# Patient Record
Sex: FEMALE | Race: WHITE | NOT HISPANIC OR LATINO | Employment: OTHER | URBAN - METROPOLITAN AREA
[De-identification: names, ages, dates, MRNs, and addresses within clinical notes are randomized per-mention and may not be internally consistent; named-entity substitution may affect disease eponyms.]

---

## 2022-12-15 ENCOUNTER — APPOINTMENT (OUTPATIENT)
Dept: CARDIOLOGY | Facility: MEDICAL CENTER | Age: 82
DRG: 417 | End: 2022-12-15
Attending: INTERNAL MEDICINE
Payer: MEDICARE

## 2022-12-15 ENCOUNTER — APPOINTMENT (OUTPATIENT)
Dept: RADIOLOGY | Facility: MEDICAL CENTER | Age: 82
DRG: 417 | End: 2022-12-15
Attending: EMERGENCY MEDICINE
Payer: MEDICARE

## 2022-12-15 ENCOUNTER — HOSPITAL ENCOUNTER (INPATIENT)
Facility: MEDICAL CENTER | Age: 82
LOS: 4 days | DRG: 417 | End: 2022-12-19
Attending: STUDENT IN AN ORGANIZED HEALTH CARE EDUCATION/TRAINING PROGRAM | Admitting: STUDENT IN AN ORGANIZED HEALTH CARE EDUCATION/TRAINING PROGRAM
Payer: MEDICARE

## 2022-12-15 ENCOUNTER — HOSPITAL ENCOUNTER (OUTPATIENT)
Dept: RADIOLOGY | Facility: MEDICAL CENTER | Age: 82
End: 2022-12-15

## 2022-12-15 DIAGNOSIS — J96.01 ACUTE RESPIRATORY FAILURE WITH HYPOXIA (HCC): ICD-10-CM

## 2022-12-15 DIAGNOSIS — I21.4 NSTEMI (NON-ST ELEVATED MYOCARDIAL INFARCTION) (HCC): ICD-10-CM

## 2022-12-15 DIAGNOSIS — I10 PRIMARY HYPERTENSION: ICD-10-CM

## 2022-12-15 DIAGNOSIS — D18.03 HEPATIC HEMANGIOMA: ICD-10-CM

## 2022-12-15 DIAGNOSIS — Z90.49 STATUS POST LAPAROSCOPIC CHOLECYSTECTOMY: ICD-10-CM

## 2022-12-15 PROBLEM — K80.20 CALCULUS OF GALLBLADDER: Status: ACTIVE | Noted: 2022-12-15

## 2022-12-15 PROBLEM — R07.9 PAIN IN THE CHEST: Status: ACTIVE | Noted: 2022-12-15

## 2022-12-15 PROBLEM — R07.89 CHEST PAIN, ATYPICAL: Status: ACTIVE | Noted: 2022-12-15

## 2022-12-15 PROBLEM — Z71.89 ADVANCE CARE PLANNING: Status: ACTIVE | Noted: 2022-12-15

## 2022-12-15 PROBLEM — D72.829 LEUCOCYTOSIS: Status: ACTIVE | Noted: 2022-12-15

## 2022-12-15 LAB
ALBUMIN SERPL BCP-MCNC: 4.2 G/DL (ref 3.2–4.9)
ALBUMIN/GLOB SERPL: 1.5 G/DL
ALP SERPL-CCNC: 90 U/L (ref 30–99)
ALT SERPL-CCNC: 17 U/L (ref 2–50)
ANION GAP SERPL CALC-SCNC: 12 MMOL/L (ref 7–16)
APTT PPP: >240 SEC (ref 24.7–36)
AST SERPL-CCNC: 23 U/L (ref 12–45)
BASOPHILS # BLD AUTO: 0.3 % (ref 0–1.8)
BASOPHILS # BLD: 0.05 K/UL (ref 0–0.12)
BILIRUB SERPL-MCNC: 0.3 MG/DL (ref 0.1–1.5)
BUN SERPL-MCNC: 10 MG/DL (ref 8–22)
CALCIUM ALBUM COR SERPL-MCNC: 8.7 MG/DL (ref 8.5–10.5)
CALCIUM SERPL-MCNC: 8.9 MG/DL (ref 8.5–10.5)
CHLORIDE SERPL-SCNC: 103 MMOL/L (ref 96–112)
CO2 SERPL-SCNC: 21 MMOL/L (ref 20–33)
CREAT SERPL-MCNC: 0.45 MG/DL (ref 0.5–1.4)
EKG IMPRESSION: NORMAL
EOSINOPHIL # BLD AUTO: 0.01 K/UL (ref 0–0.51)
EOSINOPHIL NFR BLD: 0.1 % (ref 0–6.9)
ERYTHROCYTE [DISTWIDTH] IN BLOOD BY AUTOMATED COUNT: 44.8 FL (ref 35.9–50)
GFR SERPLBLD CREATININE-BSD FMLA CKD-EPI: 97 ML/MIN/1.73 M 2
GLOBULIN SER CALC-MCNC: 2.8 G/DL (ref 1.9–3.5)
GLUCOSE SERPL-MCNC: 122 MG/DL (ref 65–99)
HCT VFR BLD AUTO: 41.9 % (ref 37–47)
HGB BLD-MCNC: 14.5 G/DL (ref 12–16)
IMM GRANULOCYTES # BLD AUTO: 0.1 K/UL (ref 0–0.11)
IMM GRANULOCYTES NFR BLD AUTO: 0.6 % (ref 0–0.9)
INR PPP: 1.11 (ref 0.87–1.13)
LIPASE SERPL-CCNC: 16 U/L (ref 11–82)
LV EJECT FRACT  99904: 60
LV EJECT FRACT MOD 2C 99903: 42.91
LV EJECT FRACT MOD 4C 99902: 63.16
LV EJECT FRACT MOD BP 99901: 54.47
LYMPHOCYTES # BLD AUTO: 1.96 K/UL (ref 1–4.8)
LYMPHOCYTES NFR BLD: 11.5 % (ref 22–41)
MCH RBC QN AUTO: 32.8 PG (ref 27–33)
MCHC RBC AUTO-ENTMCNC: 34.6 G/DL (ref 33.6–35)
MCV RBC AUTO: 94.8 FL (ref 81.4–97.8)
MONOCYTES # BLD AUTO: 0.95 K/UL (ref 0–0.85)
MONOCYTES NFR BLD AUTO: 5.6 % (ref 0–13.4)
NEUTROPHILS # BLD AUTO: 13.95 K/UL (ref 2–7.15)
NEUTROPHILS NFR BLD: 81.9 % (ref 44–72)
NRBC # BLD AUTO: 0 K/UL
NRBC BLD-RTO: 0 /100 WBC
PLATELET # BLD AUTO: 297 K/UL (ref 164–446)
PMV BLD AUTO: 9.6 FL (ref 9–12.9)
POTASSIUM SERPL-SCNC: 4.3 MMOL/L (ref 3.6–5.5)
PROCALCITONIN SERPL-MCNC: 0.05 NG/ML
PROCALCITONIN SERPL-MCNC: 0.06 NG/ML
PROT SERPL-MCNC: 7 G/DL (ref 6–8.2)
PROTHROMBIN TIME: 14.2 SEC (ref 12–14.6)
RBC # BLD AUTO: 4.42 M/UL (ref 4.2–5.4)
SODIUM SERPL-SCNC: 136 MMOL/L (ref 135–145)
TROPONIN T SERPL-MCNC: 28 NG/L (ref 6–19)
TROPONIN T SERPL-MCNC: 29 NG/L (ref 6–19)
TROPONIN T SERPL-MCNC: 31 NG/L (ref 6–19)
UFH PPP CHRO-ACNC: >1.1 IU/ML
WBC # BLD AUTO: 17 K/UL (ref 4.8–10.8)

## 2022-12-15 PROCEDURE — 85610 PROTHROMBIN TIME: CPT | Mod: 91

## 2022-12-15 PROCEDURE — 93306 TTE W/DOPPLER COMPLETE: CPT | Mod: 26 | Performed by: INTERNAL MEDICINE

## 2022-12-15 PROCEDURE — 96376 TX/PRO/DX INJ SAME DRUG ADON: CPT

## 2022-12-15 PROCEDURE — 700111 HCHG RX REV CODE 636 W/ 250 OVERRIDE (IP)

## 2022-12-15 PROCEDURE — A9270 NON-COVERED ITEM OR SERVICE: HCPCS | Performed by: EMERGENCY MEDICINE

## 2022-12-15 PROCEDURE — 700102 HCHG RX REV CODE 250 W/ 637 OVERRIDE(OP): Performed by: EMERGENCY MEDICINE

## 2022-12-15 PROCEDURE — 36415 COLL VENOUS BLD VENIPUNCTURE: CPT

## 2022-12-15 PROCEDURE — 84145 PROCALCITONIN (PCT): CPT

## 2022-12-15 PROCEDURE — 700105 HCHG RX REV CODE 258: Performed by: STUDENT IN AN ORGANIZED HEALTH CARE EDUCATION/TRAINING PROGRAM

## 2022-12-15 PROCEDURE — 78227 HEPATOBIL SYST IMAGE W/DRUG: CPT

## 2022-12-15 PROCEDURE — 770020 HCHG ROOM/CARE - TELE (206)

## 2022-12-15 PROCEDURE — 96366 THER/PROPH/DIAG IV INF ADDON: CPT

## 2022-12-15 PROCEDURE — 700102 HCHG RX REV CODE 250 W/ 637 OVERRIDE(OP): Performed by: INTERNAL MEDICINE

## 2022-12-15 PROCEDURE — 99223 1ST HOSP IP/OBS HIGH 75: CPT | Mod: AI | Performed by: STUDENT IN AN ORGANIZED HEALTH CARE EDUCATION/TRAINING PROGRAM

## 2022-12-15 PROCEDURE — 83690 ASSAY OF LIPASE: CPT | Mod: 91

## 2022-12-15 PROCEDURE — 85730 THROMBOPLASTIN TIME PARTIAL: CPT | Mod: 91

## 2022-12-15 PROCEDURE — 96365 THER/PROPH/DIAG IV INF INIT: CPT

## 2022-12-15 PROCEDURE — 99285 EMERGENCY DEPT VISIT HI MDM: CPT

## 2022-12-15 PROCEDURE — 99223 1ST HOSP IP/OBS HIGH 75: CPT | Performed by: INTERNAL MEDICINE

## 2022-12-15 PROCEDURE — 85520 HEPARIN ASSAY: CPT

## 2022-12-15 PROCEDURE — 700102 HCHG RX REV CODE 250 W/ 637 OVERRIDE(OP): Performed by: STUDENT IN AN ORGANIZED HEALTH CARE EDUCATION/TRAINING PROGRAM

## 2022-12-15 PROCEDURE — 700111 HCHG RX REV CODE 636 W/ 250 OVERRIDE (IP): Performed by: EMERGENCY MEDICINE

## 2022-12-15 PROCEDURE — 84484 ASSAY OF TROPONIN QUANT: CPT | Mod: 91

## 2022-12-15 PROCEDURE — 85025 COMPLETE CBC W/AUTO DIFF WBC: CPT | Mod: 91

## 2022-12-15 PROCEDURE — 700111 HCHG RX REV CODE 636 W/ 250 OVERRIDE (IP): Performed by: STUDENT IN AN ORGANIZED HEALTH CARE EDUCATION/TRAINING PROGRAM

## 2022-12-15 PROCEDURE — 93005 ELECTROCARDIOGRAM TRACING: CPT

## 2022-12-15 PROCEDURE — 93005 ELECTROCARDIOGRAM TRACING: CPT | Performed by: STUDENT IN AN ORGANIZED HEALTH CARE EDUCATION/TRAINING PROGRAM

## 2022-12-15 PROCEDURE — A9270 NON-COVERED ITEM OR SERVICE: HCPCS | Performed by: INTERNAL MEDICINE

## 2022-12-15 PROCEDURE — 96375 TX/PRO/DX INJ NEW DRUG ADDON: CPT

## 2022-12-15 PROCEDURE — A9270 NON-COVERED ITEM OR SERVICE: HCPCS | Performed by: STUDENT IN AN ORGANIZED HEALTH CARE EDUCATION/TRAINING PROGRAM

## 2022-12-15 PROCEDURE — 80053 COMPREHEN METABOLIC PANEL: CPT

## 2022-12-15 PROCEDURE — 99233 SBSQ HOSP IP/OBS HIGH 50: CPT | Performed by: SURGERY

## 2022-12-15 PROCEDURE — 93306 TTE W/DOPPLER COMPLETE: CPT

## 2022-12-15 RX ORDER — OXYCODONE HYDROCHLORIDE 5 MG/1
2.5 TABLET ORAL
Status: DISCONTINUED | OUTPATIENT
Start: 2022-12-15 | End: 2022-12-17

## 2022-12-15 RX ORDER — OMEPRAZOLE 20 MG/1
20 CAPSULE, DELAYED RELEASE ORAL DAILY
Status: DISCONTINUED | OUTPATIENT
Start: 2022-12-15 | End: 2022-12-19 | Stop reason: HOSPADM

## 2022-12-15 RX ORDER — LABETALOL HYDROCHLORIDE 5 MG/ML
10 INJECTION, SOLUTION INTRAVENOUS EVERY 4 HOURS PRN
Status: DISCONTINUED | OUTPATIENT
Start: 2022-12-15 | End: 2022-12-19 | Stop reason: HOSPADM

## 2022-12-15 RX ORDER — ASPIRIN 81 MG/1
324 TABLET, CHEWABLE ORAL DAILY
Status: DISCONTINUED | OUTPATIENT
Start: 2022-12-15 | End: 2022-12-16

## 2022-12-15 RX ORDER — ACETAMINOPHEN 325 MG/1
650 TABLET ORAL EVERY 6 HOURS PRN
Status: DISCONTINUED | OUTPATIENT
Start: 2022-12-15 | End: 2022-12-19 | Stop reason: HOSPADM

## 2022-12-15 RX ORDER — HEPARIN SODIUM 5000 [USP'U]/100ML
0-30 INJECTION, SOLUTION INTRAVENOUS CONTINUOUS
Status: DISCONTINUED | OUTPATIENT
Start: 2022-12-15 | End: 2022-12-15

## 2022-12-15 RX ORDER — HEPARIN SODIUM 1000 [USP'U]/ML
40 INJECTION, SOLUTION INTRAVENOUS; SUBCUTANEOUS PRN
Status: DISCONTINUED | OUTPATIENT
Start: 2022-12-15 | End: 2022-12-15

## 2022-12-15 RX ORDER — SIMVASTATIN 20 MG
20 TABLET ORAL NIGHTLY
Status: DISCONTINUED | OUTPATIENT
Start: 2022-12-15 | End: 2022-12-19 | Stop reason: HOSPADM

## 2022-12-15 RX ORDER — ACETAMINOPHEN 500 MG
500-1000 TABLET ORAL EVERY 6 HOURS PRN
COMMUNITY

## 2022-12-15 RX ORDER — HYDROMORPHONE HYDROCHLORIDE 1 MG/ML
0.25 INJECTION, SOLUTION INTRAMUSCULAR; INTRAVENOUS; SUBCUTANEOUS
Status: DISCONTINUED | OUTPATIENT
Start: 2022-12-15 | End: 2022-12-17

## 2022-12-15 RX ORDER — METOPROLOL SUCCINATE 25 MG/1
25 TABLET, EXTENDED RELEASE ORAL DAILY
Status: DISCONTINUED | OUTPATIENT
Start: 2022-12-15 | End: 2022-12-19 | Stop reason: HOSPADM

## 2022-12-15 RX ORDER — AMOXICILLIN 250 MG
2 CAPSULE ORAL 2 TIMES DAILY
Status: DISCONTINUED | OUTPATIENT
Start: 2022-12-15 | End: 2022-12-19 | Stop reason: HOSPADM

## 2022-12-15 RX ORDER — AMLODIPINE BESYLATE 10 MG/1
10 TABLET ORAL
Status: DISCONTINUED | OUTPATIENT
Start: 2022-12-15 | End: 2022-12-19 | Stop reason: HOSPADM

## 2022-12-15 RX ORDER — ASPIRIN 300 MG/1
300 SUPPOSITORY RECTAL DAILY
Status: DISCONTINUED | OUTPATIENT
Start: 2022-12-15 | End: 2022-12-16

## 2022-12-15 RX ORDER — POLYETHYLENE GLYCOL 3350 17 G/17G
1 POWDER, FOR SOLUTION ORAL
Status: DISCONTINUED | OUTPATIENT
Start: 2022-12-15 | End: 2022-12-19 | Stop reason: HOSPADM

## 2022-12-15 RX ORDER — ONDANSETRON 4 MG/1
4 TABLET, ORALLY DISINTEGRATING ORAL EVERY 4 HOURS PRN
Status: DISCONTINUED | OUTPATIENT
Start: 2022-12-15 | End: 2022-12-19 | Stop reason: HOSPADM

## 2022-12-15 RX ORDER — OXYCODONE HYDROCHLORIDE 5 MG/1
5 TABLET ORAL
Status: DISCONTINUED | OUTPATIENT
Start: 2022-12-15 | End: 2022-12-17

## 2022-12-15 RX ORDER — ONDANSETRON 2 MG/ML
4 INJECTION INTRAMUSCULAR; INTRAVENOUS EVERY 4 HOURS PRN
Status: DISCONTINUED | OUTPATIENT
Start: 2022-12-15 | End: 2022-12-19 | Stop reason: HOSPADM

## 2022-12-15 RX ORDER — MORPHINE SULFATE 4 MG/ML
INJECTION INTRAVENOUS
Status: COMPLETED
Start: 2022-12-15 | End: 2022-12-15

## 2022-12-15 RX ORDER — ASPIRIN 325 MG
325 TABLET ORAL DAILY
Status: DISCONTINUED | OUTPATIENT
Start: 2022-12-15 | End: 2022-12-16

## 2022-12-15 RX ORDER — LISINOPRIL 5 MG/1
5 TABLET ORAL
Status: DISCONTINUED | OUTPATIENT
Start: 2022-12-15 | End: 2022-12-19 | Stop reason: HOSPADM

## 2022-12-15 RX ORDER — BISACODYL 10 MG
10 SUPPOSITORY, RECTAL RECTAL
Status: DISCONTINUED | OUTPATIENT
Start: 2022-12-15 | End: 2022-12-19 | Stop reason: HOSPADM

## 2022-12-15 RX ORDER — ONDANSETRON 2 MG/ML
4 INJECTION INTRAMUSCULAR; INTRAVENOUS ONCE
Status: COMPLETED | OUTPATIENT
Start: 2022-12-15 | End: 2022-12-15

## 2022-12-15 RX ADMIN — METOPROLOL SUCCINATE 25 MG: 25 TABLET, EXTENDED RELEASE ORAL at 10:19

## 2022-12-15 RX ADMIN — LIDOCAINE HYDROCHLORIDE 30 ML: 20 SOLUTION OROPHARYNGEAL at 16:43

## 2022-12-15 RX ADMIN — ONDANSETRON 4 MG: 2 INJECTION INTRAMUSCULAR; INTRAVENOUS at 16:41

## 2022-12-15 RX ADMIN — HEPARIN SODIUM 18 UNITS/KG/HR: 5000 INJECTION, SOLUTION INTRAVENOUS at 10:34

## 2022-12-15 RX ADMIN — HEPARIN SODIUM 2500 UNITS: 1000 INJECTION, SOLUTION INTRAVENOUS; SUBCUTANEOUS at 10:34

## 2022-12-15 RX ADMIN — AMLODIPINE BESYLATE 10 MG: 10 TABLET ORAL at 12:17

## 2022-12-15 RX ADMIN — MORPHINE SULFATE 3 MG: 4 INJECTION, SOLUTION INTRAMUSCULAR; INTRAVENOUS at 15:45

## 2022-12-15 RX ADMIN — LISINOPRIL 5 MG: 10 TABLET ORAL at 12:17

## 2022-12-15 RX ADMIN — ASPIRIN 324 MG: 81 TABLET, CHEWABLE ORAL at 10:18

## 2022-12-15 RX ADMIN — AMPICILLIN AND SULBACTAM 3 G: 1; 2 INJECTION, POWDER, FOR SOLUTION INTRAMUSCULAR; INTRAVENOUS at 18:08

## 2022-12-15 RX ADMIN — ONDANSETRON 4 MG: 2 INJECTION INTRAMUSCULAR; INTRAVENOUS at 09:53

## 2022-12-15 RX ADMIN — SIMVASTATIN 20 MG: 20 TABLET, FILM COATED ORAL at 20:22

## 2022-12-15 RX ADMIN — OMEPRAZOLE 20 MG: 20 CAPSULE, DELAYED RELEASE ORAL at 10:35

## 2022-12-15 RX ADMIN — NITROGLYCERIN 0.5 INCH: 20 OINTMENT TOPICAL at 09:54

## 2022-12-15 ASSESSMENT — ENCOUNTER SYMPTOMS
SHORTNESS OF BREATH: 0
FEVER: 0
BRUISES/BLEEDS EASILY: 0
VOMITING: 0
DIZZINESS: 0
NAUSEA: 0
COUGH: 0
MYALGIAS: 0
BLURRED VISION: 0

## 2022-12-15 ASSESSMENT — PAIN DESCRIPTION - PAIN TYPE: TYPE: ACUTE PAIN

## 2022-12-15 ASSESSMENT — FIBROSIS 4 INDEX
FIB4 SCORE: 1.5
FIB4 SCORE: 0.98

## 2022-12-15 NOTE — H&P
Hospital Medicine History & Physical Note    Date of Service  12/15/2022    Primary Care Physician  Pcp Pt States None    Consultants  cardiology and general surgery        Code Status  Full Code    Chief Complaint  Chief Complaint   Patient presents with    Sent by MD     Transfer from [facility] for NSTEMI. Patient started to have indigestion last night at 630p and progressively got worse. Initial trop 106, then 60. Patient on nitro and heparin gtt on arrival       History of Presenting Illness  Katalina Correa is a 82 y.o. female with past medical history of CAD, hypertension who was transferred on 12/15/2022 from [facility] for NSTEMI and cardiology evaluation.    Patient reportedly had lower chest pain referring to her back and went to [facility] for evaluation.  CT abdomen pelvis unremarkable for dissection.  Noted to have elevated troponin trended up to 100, EKG with  nonspecific ST changes.      Patient seen and examined bedside.  Currently asymptomatic.  She does reports of having MI 25 years ago but denies having any stent placed.  Reports she has history of hypertension but not been taking any antihypertensive medication.      Labs noted with worsening leukocytosis.  Ultrasound unremarkable for cholecystitis, CT abdomen with questionable cholecystitis.    Surgeon  Dr byrne consulted for evaluation.    Cardiology  Dr Calderon consulted for NSTEMI    I discussed the plan of care with patient.    Review of Systems  Review of Systems   Constitutional:  Negative for fever.   HENT:  Negative for hearing loss.    Eyes:  Negative for blurred vision.   Respiratory:  Negative for cough and shortness of breath.    Cardiovascular:  Positive for chest pain.   Gastrointestinal:  Negative for nausea and vomiting.   Genitourinary:  Negative for dysuria.   Musculoskeletal:  Negative for myalgias.   Skin:  Negative for rash.   Neurological:  Negative for dizziness.   Endo/Heme/Allergies:  Does not bruise/bleed easily.     Past Medical History    has a past medical history of Abnormal EKG, Hyperlipidemia, Hypertension, and Reflux.    Surgical History   has a past surgical history that includes gyn surgery; gyn surgery; tonsillectomy and adenoidectomy; and hysterectomy laparoscopy.     Family History    Family history reviewed with patient. There is no family history that is pertinent to the chief complaint.     Social History   reports that she has never smoked. She does not have any smokeless tobacco history on file. She reports current alcohol use of about 4.2 oz per week. She reports that she does not use drugs.    Allergies  No Known Allergies    Medications  Prior to Admission Medications   Prescriptions Last Dose Informant Patient Reported? Taking?   aspirin EC (ECOTRIN) 81 MG Tablet Delayed Response   Yes No   Sig: Take 81 mg by mouth every day.   metoprolol SR (TOPROL XL) 25 MG TABLET SR 24 HR   Yes No   Sig: Take 25 mg by mouth every day.   omeprazole (PRILOSEC) 20 MG delayed-release capsule   Yes No   Sig: Take 20 mg by mouth every day.   simvastatin (ZOCOR) 20 MG Tab   Yes No   Sig: Take 20 mg by mouth every evening.      Facility-Administered Medications: None       Physical Exam  Temp:  [36.1 °C (97 °F)-36.6 °C (97.8 °F)] 36.6 °C (97.8 °F)  Pulse:  [] 93  Resp:  [10-23] 10  BP: (159-205)/() 182/87  SpO2:  [88 %-99 %] 97 %  Blood Pressure : (!) 182/87   Temperature: 36.6 °C (97.8 °F)   Pulse: 93   Respiration: (!) 10   Pulse Oximetry: 97 %       Physical Exam  Constitutional:       General: She is not in acute distress.  HENT:      Head: Normocephalic and atraumatic.      Right Ear: Tympanic membrane normal.      Left Ear: Tympanic membrane normal.      Nose: Nose normal.      Mouth/Throat:      Mouth: Mucous membranes are moist.   Eyes:      Extraocular Movements: Extraocular movements intact.      Pupils: Pupils are equal, round, and reactive to light.   Cardiovascular:      Rate and Rhythm: Normal rate and regular rhythm.       Pulses: Normal pulses.   Pulmonary:      Effort: Pulmonary effort is normal. No respiratory distress.   Abdominal:      General: Abdomen is flat. There is no distension.      Tenderness: There is abdominal tenderness (right upper quadrant).   Musculoskeletal:      Cervical back: Normal range of motion.      Right lower leg: No edema.      Left lower leg: No edema.   Skin:     General: Skin is warm.   Neurological:      General: No focal deficit present.      Mental Status: She is alert and oriented to person, place, and time. Mental status is at baseline.   Psychiatric:         Mood and Affect: Mood normal.       Laboratory:  Recent Labs     12/15/22  0150   WBC 14.2*   RBC 4.67   HEMOGLOBIN 15.3   HEMATOCRIT 44.0   MCV 94.2   MCH 32.8*   MCHC 34.8   RDW 12.9   PLATELETCT 309   MPV 9.1     Recent Labs     12/15/22  0150   SODIUM 138   POTASSIUM 3.9   CHLORIDE 101   CO2 22   GLUCOSE 139*   BUN 18   CREATININE 1.0   CALCIUM 9.8     Recent Labs     12/15/22  0150   ALTSGPT 25   ASTSGOT 19   ALKPHOSPHAT 100   TBILIRUBIN 0.3   DBILIRUBIN <0.2   LIPASE 117   GLUCOSE 139*     Recent Labs     12/15/22  0150   APTT 27.1   INR 0.92     No results for input(s): NTPROBNP in the last 72 hours.      No results for input(s): TROPONINT in the last 72 hours.    Imaging:  No orders to display       X-Ray:  I have personally reviewed the images and compared with prior images.  EKG:  I have personally reviewed the images and compared with prior images.    Assessment/Plan:  Justification for Admission Status  I anticipate this patient will require at least two midnights for appropriate medical management, necessitating inpatient admission because NSTEMI requiring heparin drip, telemetry monitoring and possible cardiac cath, cholecystitis likely requiring intervention        * NSTEMI (non-ST elevated myocardial infarction) (HCC)  Assessment & Plan    Telemetry monitoring  Follow serial troponin/CK-MB/EKG  Transthoracic  echocardiograph  O2 2L per nasal cannula to keep saturation more than 92%  Loading with aspirin 325 mg first now if not already given, then aspirin 81 mg daily  On simvastatin   Metoprolol   Nitropatch PRN  NPO    Cardiology consulted  Check lipid panel  CBC/BMP AM      Advance care planning  Assessment & Plan  I had a prolonged discussion with the patient  regarding goals of care, diagnoses, prognosis, and CODE STATUS. We discussed  her prognosis and comorbidities. I spent 11 minutes on advanced care planning in addition to the time spent managing the other medical problems.      She requested for DNR/DNI    Leucocytosis  Assessment & Plan  Stress induced, afebrile, also likely from underlying cholecystitis  No sign of active infection  Remain afebrile  Chest x-ray unremarkable  Check UA   Check procalcitonin      Hypertension  Assessment & Plan  Not on antihypertensive  Start on Norvasc, lisinopril  On IV labetalol as needed  Monitor BP closely      Calculus of gallbladder  Assessment & Plan  Concern cholecystitis given right upper quadrant tenderness, leukocytosis  CT abdomen consistent with cholecystitis  Surgeon  Dr byrne consulted for evaluation    Pain in the chest  Assessment & Plan    -telemetry monitoring  -Follow serial troponin/CK-MB/EKG  -Transthoracic echocardiograph to identify regional wall motion abnormalities and assess LV function  -Stress test  -Aspirin 325 mg  -Heparin drip  -NPO for possible Cath  -Cardiology consulted   -Check lipid panel  -Supplemental oxygen            VTE prophylaxis: SCDs/TEDs heparin drip

## 2022-12-15 NOTE — ED TRIAGE NOTES
Chief Complaint   Patient presents with    Sent by MD     Transfer from [facility] for NSTEMI. Patient started to have indigestion last night at 630p and progressively got worse. Initial trop 106, then 60. Patient on nitro and heparin gtt on arrival

## 2022-12-15 NOTE — CONSULTS
CHIEF COMPLAINT: right upper quadrant pain     HISTORY OF PRESENT ILLNESS: The patient is an 82 year-old White woman who presents to the Emergency Department a 2- day history of moderate and severe right subcostal and right upper quadrant  abdominal pain.  Patient was transferred here for NSTEMI and cardiology evaluation.  Of note throughout the work-up for the NSTEMI the gallbladder was noted to be distended and have a large dependent gallstone.  She does states she has had some intermittent right upper quadrant pain but nothing this severe in the past.  Her troponin was elevated at the outside hospital and she is on a heparin drip when I met her.  She was also initially on a nitro drip.  She states that she was unaware that she had gallstones.  She did have a CT scan at the outside hospital which is concerning for potential cholecystitis.  She had an ultrasound as well which was not particularly remarkable.  HIDA scan is pending.  The patient denies any recent or intercurrent illness. The patient has undergone hysterectomy.    PAST MEDICAL HISTORY:  has a past medical history of Abnormal EKG, Hyperlipidemia, Hypertension, and Reflux.    PAST SURGICAL HISTORY:  has a past surgical history that includes gyn surgery; gyn surgery; tonsillectomy and adenoidectomy; and hysterectomy laparoscopy.    ALLERGIES: No Known Allergies    CURRENT MEDICATIONS:    Home Medications       Reviewed by Daya Crowley (Pharmacy Tech) on 12/15/22 at 1019  Med List Status: Complete     Medication Last Dose Status   acetaminophen (TYLENOL) 500 MG Tab FEW DAYS AGO Active   omeprazole (PRILOSEC) 20 MG delayed-release capsule 12/13/2022 Active                    FAMILY HISTORY: family history is not on file.    SOCIAL HISTORY:  reports that she has never smoked. She does not have any smokeless tobacco history on file. She reports current alcohol use of about 4.2 oz per week. She reports that she does not use drugs.    REVIEW OF  "SYSTEMS: Comprehensive review of systems is negative with the exception of the aforementioned HPI, PMH, and PSH bullets in accordance with CMS guidelines.    PHYSICAL EXAMINATION:      Constitutional:     Vital Signs: BP (!) 183/86   Pulse 93   Temp 36.6 °C (97.8 °F) (Temporal)   Resp (!) 32   Ht 1.626 m (5' 4\")   Wt 72.6 kg (160 lb)   SpO2 97%    General Appearance: appears stated age, is in no apparent distress.  HEENT: The pupils are equal, round, and reactive to light bilaterally. The extraocular muscles are intact bilaterally.. The sclera are not icteric. Nares and oropharynx are clear.   Neck: Supple. No adenopathy.  Respiratory:   Inspection: Unlabored respirations, no intercostal retractions, paradoxical motion, or accessory muscle use.   Auscultation: normal.  Cardiovascular:   Inspection: The skin is warm and dry.  Auscultation: Regular rate and rhythm.   Peripheral Pulses: Normal.   Abdomen:  Inspection: Abdominal inspection reveals no abrasions, contusions, lacerations or penetrating wounds.   Palpation: Palpation is remarkable for severe tenderness in the right upper quadrant  region. No abdominal wall hernias.  Extremities:   Examination of the upper and lower extremities demonstrates no cyanosis edema or clubbing.  Neurologic:   Alert & oriented to person, time and place. Normal motor function. Normal sensory function. No focal deficits noted.    LABORATORY VALUES:   Recent Labs     12/15/22  0150 12/15/22  0958   WBC 14.2* 17.0*   RBC 4.67 4.42   HEMOGLOBIN 15.3 14.5   HEMATOCRIT 44.0 41.9   MCV 94.2 94.8   MCH 32.8* 32.8   MCHC 34.8 34.6   RDW 12.9 44.8   PLATELETCT 309 297   MPV 9.1 9.6     Recent Labs     12/15/22  0150 12/15/22  0958   SODIUM 138 136   POTASSIUM 3.9 4.3   CHLORIDE 101 103   CO2 22 21   GLUCOSE 139* 122*   BUN 18 10   CREATININE 1.0 0.45*   CALCIUM 9.8 8.9     Recent Labs     12/15/22  0150 12/15/22  0958 12/15/22  1128   ASTSGOT 19 23  --    ALTSGPT 25 17  --    TBILIRUBIN " 0.3 0.3  --    IBILIRUBIN 0.1  --   --    DBILIRUBIN <0.2  --   --    ALKPHOSPHAT 100 90  --    GLOBULIN  --  2.8  --    INR 0.92  --  1.11     Recent Labs     12/15/22  0150 12/15/22  1128   APTT 27.1 >240.0*   INR 0.92 1.11        IMAGING:   NM-BILIARY (HIDA) SCAN WITH CCK   Final Result      Failure of the gallbladder to fill with radiotracer despite morphine administration. Findings are suspicious for acute cholecystitis.         EC-ECHOCARDIOGRAM COMPLETE W/O CONT   Final Result          ASSESSMENT AND PLAN:     Calculus of gallbladder  Assessment & Plan  Patient does have right upper quadrant pain with a large dependent gallstone.  I do have strong suspicion that she does have some element of cholecystitis.  A HIDA scan is pending.  We will follow the HIDA scan closely.  In the event the HIDA scan is positive for cholecystitis we will have further discussions regarding holding heparin and timing of surgery with the current cardiac concerns.  Alternatively a cholecystostomy tube could be placed with an interval cholecystectomy.       ____________________________________     Dolores Jones M.D.    DD: 12/15/2022  11:12 AM

## 2022-12-15 NOTE — ED PROVIDER NOTES
ED Provider Note    CHIEF COMPLAINT  Chief Complaint   Patient presents with    Sent by MD     Transfer from [facility] for NSTEMI. Patient started to have indigestion last night at 630p and progressively got worse. Initial trop 106, then 60. Patient on nitro and heparin gtt on arrival       HPI  Katalina Correa is a 82 y.o. female who presents transferred from outside hospital for NSTEMI.  Patient was hypertensive at the time with chest and back pain.  Patient had a CTA of her chest abdomen and pelvis to evaluate for possible dissection, this failed to reveal dissection.  She does have a gallstone identified.  Patient basic labs were notable for a mild elevated white count, she also had an initial elevated troponin at 60 which was just above their threshold for positivity but this increased to 100.  Given patient's ongoing chest pain and uptrending troponin patient was transferred to facility for urgent cardiology involvement.  Of note patient does report that chest pain started shortly after she finished dinner last night.  Patient reports that majority of her pain is in her upper abdomen with radiations to her bilateral flanks.  Pain has been relatively constant since that time but she does feel better now than when she initially presented.  Patient ultrasound reveals a mildly distended gallbladder with associated stone but no other findings consistent with acute cholecystitis.    REVIEW OF SYSTEMS  ROS    See HPI for further details. All other systems are negative.     PAST MEDICAL HISTORY   has a past medical history of Abnormal EKG, Hyperlipidemia, Hypertension, and Reflux.    SOCIAL HISTORY  Social History     Tobacco Use    Smoking status: Never    Smokeless tobacco: Not on file   Vaping Use    Vaping Use: Never used   Substance and Sexual Activity    Alcohol use: Yes     Alcohol/week: 4.2 oz     Types: 7 Glasses of wine per week     Comment: nightly glass of wine    Drug use: No    Sexual activity: Not on file        SURGICAL HISTORY   has a past surgical history that includes gyn surgery; gyn surgery; tonsillectomy and adenoidectomy; and hysterectomy laparoscopy.    CURRENT MEDICATIONS  Home Medications       Reviewed by Ruby Khanna R.N. (Registered Nurse) on 12/15/22 at 0902  Med List Status: Partial     Medication Last Dose Status   aspirin EC (ECOTRIN) 81 MG Tablet Delayed Response  Active   metoprolol SR (TOPROL XL) 25 MG TABLET SR 24 HR  Active   omeprazole (PRILOSEC) 20 MG delayed-release capsule  Active   simvastatin (ZOCOR) 20 MG Tab  Active                    ALLERGIES  No Known Allergies    PHYSICAL EXAM  Vitals:    12/15/22 0904   BP: (!) 182/87   Pulse: 93   Resp: (!) 10   Temp:    SpO2: 97%       Physical Exam      DIAGNOSTIC STUDIES / PROCEDURES    EKG  See below    LABS  Results for orders placed or performed during the hospital encounter of 12/15/22   TROPONIN   Result Value Ref Range    Troponin T 31 (H) 6 - 19 ng/L   LIPASE   Result Value Ref Range    Lipase 16 11 - 82 U/L   CBC WITH DIFFERENTIAL   Result Value Ref Range    WBC 17.0 (H) 4.8 - 10.8 K/uL    RBC 4.42 4.20 - 5.40 M/uL    Hemoglobin 14.5 12.0 - 16.0 g/dL    Hematocrit 41.9 37.0 - 47.0 %    MCV 94.8 81.4 - 97.8 fL    MCH 32.8 27.0 - 33.0 pg    MCHC 34.6 33.6 - 35.0 g/dL    RDW 44.8 35.9 - 50.0 fL    Platelet Count 297 164 - 446 K/uL    MPV 9.6 9.0 - 12.9 fL    Neutrophils-Polys 81.90 (H) 44.00 - 72.00 %    Lymphocytes 11.50 (L) 22.00 - 41.00 %    Monocytes 5.60 0.00 - 13.40 %    Eosinophils 0.10 0.00 - 6.90 %    Basophils 0.30 0.00 - 1.80 %    Immature Granulocytes 0.60 0.00 - 0.90 %    Nucleated RBC 0.00 /100 WBC    Neutrophils (Absolute) 13.95 (H) 2.00 - 7.15 K/uL    Lymphs (Absolute) 1.96 1.00 - 4.80 K/uL    Monos (Absolute) 0.95 (H) 0.00 - 0.85 K/uL    Eos (Absolute) 0.01 0.00 - 0.51 K/uL    Baso (Absolute) 0.05 0.00 - 0.12 K/uL    Immature Granulocytes (abs) 0.10 0.00 - 0.11 K/uL    NRBC (Absolute) 0.00 K/uL   CMP   Result Value Ref  Range    Sodium 136 135 - 145 mmol/L    Potassium 4.3 3.6 - 5.5 mmol/L    Chloride 103 96 - 112 mmol/L    Co2 21 20 - 33 mmol/L    Anion Gap 12.0 7.0 - 16.0    Glucose 122 (H) 65 - 99 mg/dL    Bun 10 8 - 22 mg/dL    Creatinine 0.45 (L) 0.50 - 1.40 mg/dL    Calcium 8.9 8.5 - 10.5 mg/dL    AST(SGOT) 23 12 - 45 U/L    ALT(SGPT) 17 2 - 50 U/L    Alkaline Phosphatase 90 30 - 99 U/L    Total Bilirubin 0.3 0.1 - 1.5 mg/dL    Albumin 4.2 3.2 - 4.9 g/dL    Total Protein 7.0 6.0 - 8.2 g/dL    Globulin 2.8 1.9 - 3.5 g/dL    A-G Ratio 1.5 g/dL   TROPONIN   Result Value Ref Range    Troponin T 29 (H) 6 - 19 ng/L   Prothrombin Time   Result Value Ref Range    PT 14.2 12.0 - 14.6 sec    INR 1.11 0.87 - 1.13   APTT   Result Value Ref Range    APTT >240.0 (HH) 24.7 - 36.0 sec   Heparin Anti-Xa   Result Value Ref Range    Heparin Xa (UFH) >1.10 (HH) IU/mL   PROCALCITONIN   Result Value Ref Range    Procalcitonin 0.05 <0.25 ng/mL   CORRECTED CALCIUM   Result Value Ref Range    Correct Calcium 8.7 8.5 - 10.5 mg/dL   ESTIMATED GFR   Result Value Ref Range    GFR (CKD-EPI) 97 >60 mL/min/1.73 m 2   PROCALCITONIN   Result Value Ref Range    Procalcitonin 0.06 <0.25 ng/mL   EKG (NOW)   Result Value Ref Range    Report       [facility] Emergency Dept.    Test Date:  2022-12-15  Pt Name:    JUJU ANSARI                 Department: ER  MRN:        [id number]                      Room:       RD 06  Gender:     Female                       Technician: [id number]  :        [id number]                   Requested By:ER TRIAGE PROTOCOL  Order #:    [id number]                    Reading MD: Jarad Tilley MD    Measurements  Intervals                                Axis  Rate:       94                           P:          28  NE:         182                          QRS:        -60  QRSD:       87                           T:          14  QT:         381  QTc:        477    Interpretive Statements  EKG is normal sinus rhythm, Q waves  present in inferior leads, no ST  elevation  or depression  Electronically Signed On 12- 9:38:28 PST by Jarad Tilley MD           RADIOLOGY  NM-BILIARY (HIDA) SCAN WITH CCK    (Results Pending)   EC-ECHOCARDIOGRAM COMPLETE W/ CONT    (Results Pending)   EC-ECHOCARDIOGRAM COMPLETE W/O CONT    (Results Pending)           COURSE & MEDICAL DECISION MAKING  Pertinent Labs & Imaging studies reviewed. (See chart for details)    Patient here with vague chest and abdominal pain.  Patient is status post CTA which showed gallstone.  Patient with some mild right upper quadrant pain on my exam but without a positive Sullivan's.  Certainly this could be a type II MI secondary to acute cholecystitis versus a incidental gallstone with NSTEMI.  Patient case is not entirely clear, and given that patient would not be a candidate for emergent surgery regardless given her possible NSTEMI will confirm with HIDA scan.  I placed the order for this.  I discussed the case with hospitalist who discussed the case with general surgery.  General surgery has evaluated patient, they agree with the current plan.  Patient is already on heparin for possible NSTEMI.  We will continue to observe and trend troponins.        FINAL IMPRESSION  1.  Elevated troponin, possible cholecystitis, acute chest pain    Electronically signed by: Jarek Abraham M.D., 12/15/2022 9:33 AM     No

## 2022-12-15 NOTE — PROGRESS NOTES
Assumed care at 1300. Pt a & o x4. Pt on 2L NC. Hida scan ordered and pt will need to be npo. Hold gi cocktail. No pain meds. Echo at bedside.    [facility]  Report of Consultation    Mirtha Owens Patient Status:  Observation    1964 MRN [id number]   [facility] 5NW-A Attending Alem Torres MD   Hosp Day # 0 PCP Ankit Gave     Reason for Consultation:  Hyperglyc Depression          Past Surgical History    OTHER SURGICAL HISTORY  10/12    Comment left knee scoped    BACK SURGERY  2007    EXCIS LUMBAR DISK,ONE LEVEL      Comment 8/3/2016    OTHER      Comment CERVICAL SURGERY    CATARACT       Family History   Prob folic acid (FOLVITE) tab 1 mg, 1 mg, Oral, Daily  •  gabapentin (NEURONTIN) cap 300 mg, 300 mg, Oral, TID  •  HYDROcodone-acetaminophen (NORCO)  MG per tab 1 tablet, 1 tablet, Oral, Q4H PRN  •  Metoprolol Tartrate (LOPRESSOR) tab 50 mg, 50 mg, Oral, clubbing, cyanosis, or edema  Skin: no rashes or lesions noted  Psychiatric: nl affect  Neurologic: patellar reflexes 2+, no tremors     Labs:    Lab Results  Component Value Date   WBC 10.0 01/16/2017   HGB 15.8 01/16/2017   HCT 46.1 01/16/2017   . PM

## 2022-12-16 ENCOUNTER — ANESTHESIA (OUTPATIENT)
Dept: SURGERY | Facility: MEDICAL CENTER | Age: 82
DRG: 417 | End: 2022-12-16
Payer: MEDICARE

## 2022-12-16 ENCOUNTER — ANESTHESIA EVENT (OUTPATIENT)
Dept: SURGERY | Facility: MEDICAL CENTER | Age: 82
DRG: 417 | End: 2022-12-16
Payer: MEDICARE

## 2022-12-16 LAB
ANION GAP SERPL CALC-SCNC: 11 MMOL/L (ref 7–16)
BUN SERPL-MCNC: 13 MG/DL (ref 8–22)
CALCIUM SERPL-MCNC: 8.9 MG/DL (ref 8.5–10.5)
CHLORIDE SERPL-SCNC: 102 MMOL/L (ref 96–112)
CO2 SERPL-SCNC: 21 MMOL/L (ref 20–33)
CREAT SERPL-MCNC: 0.46 MG/DL (ref 0.5–1.4)
EKG IMPRESSION: NORMAL
ERYTHROCYTE [DISTWIDTH] IN BLOOD BY AUTOMATED COUNT: 44.8 FL (ref 35.9–50)
GFR SERPLBLD CREATININE-BSD FMLA CKD-EPI: 96 ML/MIN/1.73 M 2
GLUCOSE SERPL-MCNC: 118 MG/DL (ref 65–99)
HCT VFR BLD AUTO: 35.9 % (ref 37–47)
HCT VFR BLD AUTO: 40.5 % (ref 37–47)
HGB BLD-MCNC: 11.9 G/DL (ref 12–16)
HGB BLD-MCNC: 14 G/DL (ref 12–16)
MCH RBC QN AUTO: 32.7 PG (ref 27–33)
MCHC RBC AUTO-ENTMCNC: 34.6 G/DL (ref 33.6–35)
MCV RBC AUTO: 94.6 FL (ref 81.4–97.8)
PATHOLOGY CONSULT NOTE: NORMAL
PLATELET # BLD AUTO: 299 K/UL (ref 164–446)
PMV BLD AUTO: 9.3 FL (ref 9–12.9)
POTASSIUM SERPL-SCNC: 3.6 MMOL/L (ref 3.6–5.5)
RBC # BLD AUTO: 4.28 M/UL (ref 4.2–5.4)
SODIUM SERPL-SCNC: 134 MMOL/L (ref 135–145)
WBC # BLD AUTO: 22.1 K/UL (ref 4.8–10.8)

## 2022-12-16 PROCEDURE — 160035 HCHG PACU - 1ST 60 MINS PHASE I: Performed by: SURGERY

## 2022-12-16 PROCEDURE — 700101 HCHG RX REV CODE 250: Performed by: ANESTHESIOLOGY

## 2022-12-16 PROCEDURE — 160002 HCHG RECOVERY MINUTES (STAT): Performed by: SURGERY

## 2022-12-16 PROCEDURE — 160036 HCHG PACU - EA ADDL 30 MINS PHASE I: Performed by: SURGERY

## 2022-12-16 PROCEDURE — 93010 ELECTROCARDIOGRAM REPORT: CPT | Performed by: INTERNAL MEDICINE

## 2022-12-16 PROCEDURE — 47562 LAPAROSCOPIC CHOLECYSTECTOMY: CPT | Performed by: SURGERY

## 2022-12-16 PROCEDURE — 700105 HCHG RX REV CODE 258: Performed by: ANESTHESIOLOGY

## 2022-12-16 PROCEDURE — 770020 HCHG ROOM/CARE - TELE (206)

## 2022-12-16 PROCEDURE — 700111 HCHG RX REV CODE 636 W/ 250 OVERRIDE (IP): Performed by: STUDENT IN AN ORGANIZED HEALTH CARE EDUCATION/TRAINING PROGRAM

## 2022-12-16 PROCEDURE — 700105 HCHG RX REV CODE 258: Performed by: STUDENT IN AN ORGANIZED HEALTH CARE EDUCATION/TRAINING PROGRAM

## 2022-12-16 PROCEDURE — A9270 NON-COVERED ITEM OR SERVICE: HCPCS | Performed by: STUDENT IN AN ORGANIZED HEALTH CARE EDUCATION/TRAINING PROGRAM

## 2022-12-16 PROCEDURE — 160041 HCHG SURGERY MINUTES - EA ADDL 1 MIN LEVEL 4: Performed by: SURGERY

## 2022-12-16 PROCEDURE — 160029 HCHG SURGERY MINUTES - 1ST 30 MINS LEVEL 4: Performed by: SURGERY

## 2022-12-16 PROCEDURE — 700101 HCHG RX REV CODE 250: Performed by: SURGERY

## 2022-12-16 PROCEDURE — 99100 ANES PT EXTEME AGE<1 YR&>70: CPT | Performed by: ANESTHESIOLOGY

## 2022-12-16 PROCEDURE — 80048 BASIC METABOLIC PNL TOTAL CA: CPT

## 2022-12-16 PROCEDURE — 700111 HCHG RX REV CODE 636 W/ 250 OVERRIDE (IP): Performed by: ANESTHESIOLOGY

## 2022-12-16 PROCEDURE — 0FT44ZZ RESECTION OF GALLBLADDER, PERCUTANEOUS ENDOSCOPIC APPROACH: ICD-10-PCS | Performed by: SURGERY

## 2022-12-16 PROCEDURE — 700102 HCHG RX REV CODE 250 W/ 637 OVERRIDE(OP): Performed by: STUDENT IN AN ORGANIZED HEALTH CARE EDUCATION/TRAINING PROGRAM

## 2022-12-16 PROCEDURE — 85027 COMPLETE CBC AUTOMATED: CPT

## 2022-12-16 PROCEDURE — 88304 TISSUE EXAM BY PATHOLOGIST: CPT

## 2022-12-16 PROCEDURE — 160048 HCHG OR STATISTICAL LEVEL 1-5: Performed by: SURGERY

## 2022-12-16 PROCEDURE — 160009 HCHG ANES TIME/MIN: Performed by: SURGERY

## 2022-12-16 PROCEDURE — 51798 US URINE CAPACITY MEASURE: CPT

## 2022-12-16 PROCEDURE — 00790 ANES IPER UPR ABD NOS: CPT | Performed by: ANESTHESIOLOGY

## 2022-12-16 PROCEDURE — 85014 HEMATOCRIT: CPT

## 2022-12-16 PROCEDURE — 99232 SBSQ HOSP IP/OBS MODERATE 35: CPT | Performed by: HOSPITALIST

## 2022-12-16 PROCEDURE — 85018 HEMOGLOBIN: CPT

## 2022-12-16 PROCEDURE — 36415 COLL VENOUS BLD VENIPUNCTURE: CPT

## 2022-12-16 PROCEDURE — 47562 LAPAROSCOPIC CHOLECYSTECTOMY: CPT | Mod: AS | Performed by: NURSE PRACTITIONER

## 2022-12-16 RX ORDER — LIDOCAINE HYDROCHLORIDE 20 MG/ML
INJECTION, SOLUTION EPIDURAL; INFILTRATION; INTRACAUDAL; PERINEURAL PRN
Status: DISCONTINUED | OUTPATIENT
Start: 2022-12-16 | End: 2022-12-16 | Stop reason: SURG

## 2022-12-16 RX ORDER — SODIUM CHLORIDE, SODIUM GLUCONATE, SODIUM ACETATE, POTASSIUM CHLORIDE AND MAGNESIUM CHLORIDE 526; 502; 368; 37; 30 MG/100ML; MG/100ML; MG/100ML; MG/100ML; MG/100ML
INJECTION, SOLUTION INTRAVENOUS
Status: DISCONTINUED | OUTPATIENT
Start: 2022-12-16 | End: 2022-12-16 | Stop reason: SURG

## 2022-12-16 RX ORDER — DEXAMETHASONE SODIUM PHOSPHATE 4 MG/ML
INJECTION, SOLUTION INTRA-ARTICULAR; INTRALESIONAL; INTRAMUSCULAR; INTRAVENOUS; SOFT TISSUE PRN
Status: DISCONTINUED | OUTPATIENT
Start: 2022-12-16 | End: 2022-12-16 | Stop reason: SURG

## 2022-12-16 RX ORDER — HYDROMORPHONE HYDROCHLORIDE 1 MG/ML
0.1 INJECTION, SOLUTION INTRAMUSCULAR; INTRAVENOUS; SUBCUTANEOUS
Status: DISCONTINUED | OUTPATIENT
Start: 2022-12-16 | End: 2022-12-16 | Stop reason: HOSPADM

## 2022-12-16 RX ORDER — PHENYLEPHRINE HCL IN 0.9% NACL 0.5 MG/5ML
SYRINGE (ML) INTRAVENOUS PRN
Status: DISCONTINUED | OUTPATIENT
Start: 2022-12-16 | End: 2022-12-16 | Stop reason: SURG

## 2022-12-16 RX ORDER — SODIUM CHLORIDE, SODIUM LACTATE, POTASSIUM CHLORIDE, CALCIUM CHLORIDE 600; 310; 30; 20 MG/100ML; MG/100ML; MG/100ML; MG/100ML
INJECTION, SOLUTION INTRAVENOUS
Status: DISCONTINUED | OUTPATIENT
Start: 2022-12-16 | End: 2022-12-16 | Stop reason: SURG

## 2022-12-16 RX ORDER — ONDANSETRON 2 MG/ML
INJECTION INTRAMUSCULAR; INTRAVENOUS PRN
Status: DISCONTINUED | OUTPATIENT
Start: 2022-12-16 | End: 2022-12-16 | Stop reason: SURG

## 2022-12-16 RX ORDER — ROCURONIUM BROMIDE 10 MG/ML
INJECTION, SOLUTION INTRAVENOUS PRN
Status: DISCONTINUED | OUTPATIENT
Start: 2022-12-16 | End: 2022-12-16 | Stop reason: SURG

## 2022-12-16 RX ORDER — LABETALOL HYDROCHLORIDE 5 MG/ML
5 INJECTION, SOLUTION INTRAVENOUS
Status: DISCONTINUED | OUTPATIENT
Start: 2022-12-16 | End: 2022-12-16 | Stop reason: HOSPADM

## 2022-12-16 RX ORDER — OXYCODONE HCL 5 MG/5 ML
5 SOLUTION, ORAL ORAL
Status: DISCONTINUED | OUTPATIENT
Start: 2022-12-16 | End: 2022-12-16 | Stop reason: HOSPADM

## 2022-12-16 RX ORDER — HYDROMORPHONE HYDROCHLORIDE 1 MG/ML
0.4 INJECTION, SOLUTION INTRAMUSCULAR; INTRAVENOUS; SUBCUTANEOUS
Status: DISCONTINUED | OUTPATIENT
Start: 2022-12-16 | End: 2022-12-16 | Stop reason: HOSPADM

## 2022-12-16 RX ORDER — CEFAZOLIN SODIUM 1 G/3ML
INJECTION, POWDER, FOR SOLUTION INTRAMUSCULAR; INTRAVENOUS PRN
Status: DISCONTINUED | OUTPATIENT
Start: 2022-12-16 | End: 2022-12-16 | Stop reason: SURG

## 2022-12-16 RX ORDER — HYDROMORPHONE HYDROCHLORIDE 1 MG/ML
0.2 INJECTION, SOLUTION INTRAMUSCULAR; INTRAVENOUS; SUBCUTANEOUS
Status: DISCONTINUED | OUTPATIENT
Start: 2022-12-16 | End: 2022-12-16 | Stop reason: HOSPADM

## 2022-12-16 RX ORDER — HYDROMORPHONE HYDROCHLORIDE 2 MG/ML
INJECTION, SOLUTION INTRAMUSCULAR; INTRAVENOUS; SUBCUTANEOUS PRN
Status: DISCONTINUED | OUTPATIENT
Start: 2022-12-16 | End: 2022-12-16 | Stop reason: SURG

## 2022-12-16 RX ORDER — PHENYLEPHRINE HYDROCHLORIDE 10 MG/ML
INJECTION, SOLUTION INTRAMUSCULAR; INTRAVENOUS; SUBCUTANEOUS PRN
Status: DISCONTINUED | OUTPATIENT
Start: 2022-12-16 | End: 2022-12-16 | Stop reason: SURG

## 2022-12-16 RX ORDER — BUPIVACAINE HYDROCHLORIDE AND EPINEPHRINE 5; 5 MG/ML; UG/ML
INJECTION, SOLUTION EPIDURAL; INTRACAUDAL; PERINEURAL
Status: DISCONTINUED | OUTPATIENT
Start: 2022-12-16 | End: 2022-12-16 | Stop reason: HOSPADM

## 2022-12-16 RX ORDER — SODIUM CHLORIDE, SODIUM LACTATE, POTASSIUM CHLORIDE, CALCIUM CHLORIDE 600; 310; 30; 20 MG/100ML; MG/100ML; MG/100ML; MG/100ML
INJECTION, SOLUTION INTRAVENOUS CONTINUOUS
Status: DISCONTINUED | OUTPATIENT
Start: 2022-12-16 | End: 2022-12-16 | Stop reason: HOSPADM

## 2022-12-16 RX ORDER — HALOPERIDOL 5 MG/ML
1 INJECTION INTRAMUSCULAR
Status: DISCONTINUED | OUTPATIENT
Start: 2022-12-16 | End: 2022-12-16 | Stop reason: HOSPADM

## 2022-12-16 RX ORDER — OXYCODONE HCL 5 MG/5 ML
10 SOLUTION, ORAL ORAL
Status: DISCONTINUED | OUTPATIENT
Start: 2022-12-16 | End: 2022-12-16 | Stop reason: HOSPADM

## 2022-12-16 RX ORDER — ONDANSETRON 2 MG/ML
4 INJECTION INTRAMUSCULAR; INTRAVENOUS
Status: DISCONTINUED | OUTPATIENT
Start: 2022-12-16 | End: 2022-12-16 | Stop reason: HOSPADM

## 2022-12-16 RX ORDER — HYDRALAZINE HYDROCHLORIDE 20 MG/ML
5 INJECTION INTRAMUSCULAR; INTRAVENOUS
Status: DISCONTINUED | OUTPATIENT
Start: 2022-12-16 | End: 2022-12-16 | Stop reason: HOSPADM

## 2022-12-16 RX ADMIN — SIMVASTATIN 20 MG: 20 TABLET, FILM COATED ORAL at 21:11

## 2022-12-16 RX ADMIN — ROCURONIUM BROMIDE 30 MG: 10 INJECTION, SOLUTION INTRAVENOUS at 10:44

## 2022-12-16 RX ADMIN — EPHEDRINE SULFATE 10 MG: 50 INJECTION, SOLUTION INTRAVENOUS at 10:01

## 2022-12-16 RX ADMIN — ACETAMINOPHEN 650 MG: 325 TABLET, FILM COATED ORAL at 14:28

## 2022-12-16 RX ADMIN — EPHEDRINE SULFATE 10 MG: 50 INJECTION, SOLUTION INTRAVENOUS at 09:59

## 2022-12-16 RX ADMIN — PROPOFOL 20 MG: 10 INJECTION, EMULSION INTRAVENOUS at 10:05

## 2022-12-16 RX ADMIN — PROPOFOL 50 MG: 10 INJECTION, EMULSION INTRAVENOUS at 10:15

## 2022-12-16 RX ADMIN — HYDROMORPHONE HYDROCHLORIDE 1 MG: 2 INJECTION INTRAMUSCULAR; INTRAVENOUS; SUBCUTANEOUS at 10:43

## 2022-12-16 RX ADMIN — CEFAZOLIN 2 G: 330 INJECTION, POWDER, FOR SOLUTION INTRAMUSCULAR; INTRAVENOUS at 09:52

## 2022-12-16 RX ADMIN — ALBUTEROL SULFATE 2.5 MG: 2.5 SOLUTION RESPIRATORY (INHALATION) at 12:58

## 2022-12-16 RX ADMIN — AMPICILLIN AND SULBACTAM 3 G: 1; 2 INJECTION, POWDER, FOR SOLUTION INTRAMUSCULAR; INTRAVENOUS at 00:12

## 2022-12-16 RX ADMIN — EPHEDRINE SULFATE 10 MG: 50 INJECTION, SOLUTION INTRAVENOUS at 10:05

## 2022-12-16 RX ADMIN — ROCURONIUM BROMIDE 50 MG: 10 INJECTION, SOLUTION INTRAVENOUS at 09:49

## 2022-12-16 RX ADMIN — FENTANYL CITRATE 50 MCG: 50 INJECTION, SOLUTION INTRAMUSCULAR; INTRAVENOUS at 11:23

## 2022-12-16 RX ADMIN — ONDANSETRON 4 MG: 2 INJECTION INTRAMUSCULAR; INTRAVENOUS at 11:21

## 2022-12-16 RX ADMIN — PROPOFOL 50 MG: 10 INJECTION, EMULSION INTRAVENOUS at 11:22

## 2022-12-16 RX ADMIN — Medication 200 MCG: at 09:58

## 2022-12-16 RX ADMIN — AMPICILLIN AND SULBACTAM 3 G: 1; 2 INJECTION, POWDER, FOR SOLUTION INTRAMUSCULAR; INTRAVENOUS at 14:36

## 2022-12-16 RX ADMIN — SUGAMMADEX 200 MG: 100 INJECTION, SOLUTION INTRAVENOUS at 11:21

## 2022-12-16 RX ADMIN — PHENYLEPHRINE HYDROCHLORIDE 100 MCG: 10 INJECTION INTRAVENOUS at 11:39

## 2022-12-16 RX ADMIN — SODIUM CHLORIDE, SODIUM GLUCONATE, SODIUM ACETATE, POTASSIUM CHLORIDE AND MAGNESIUM CHLORIDE: 526; 502; 368; 37; 30 INJECTION, SOLUTION INTRAVENOUS at 11:25

## 2022-12-16 RX ADMIN — Medication 100 MCG: at 09:52

## 2022-12-16 RX ADMIN — DEXAMETHASONE SODIUM PHOSPHATE 4 MG: 4 INJECTION, SOLUTION INTRA-ARTICULAR; INTRALESIONAL; INTRAMUSCULAR; INTRAVENOUS; SOFT TISSUE at 09:52

## 2022-12-16 RX ADMIN — SODIUM CHLORIDE, POTASSIUM CHLORIDE, SODIUM LACTATE AND CALCIUM CHLORIDE: 600; 310; 30; 20 INJECTION, SOLUTION INTRAVENOUS at 09:45

## 2022-12-16 RX ADMIN — AMPICILLIN AND SULBACTAM 3 G: 1; 2 INJECTION, POWDER, FOR SOLUTION INTRAMUSCULAR; INTRAVENOUS at 05:36

## 2022-12-16 RX ADMIN — LIDOCAINE HYDROCHLORIDE 40 MG: 20 INJECTION, SOLUTION EPIDURAL; INFILTRATION; INTRACAUDAL at 09:49

## 2022-12-16 RX ADMIN — PROPOFOL 50 MG: 10 INJECTION, EMULSION INTRAVENOUS at 10:45

## 2022-12-16 RX ADMIN — HYDROMORPHONE HYDROCHLORIDE 1 MG: 2 INJECTION INTRAMUSCULAR; INTRAVENOUS; SUBCUTANEOUS at 10:16

## 2022-12-16 RX ADMIN — FENTANYL CITRATE 100 MCG: 50 INJECTION, SOLUTION INTRAMUSCULAR; INTRAVENOUS at 09:48

## 2022-12-16 RX ADMIN — PROPOFOL 80 MG: 10 INJECTION, EMULSION INTRAVENOUS at 09:49

## 2022-12-16 RX ADMIN — PROPOFOL 50 MG: 10 INJECTION, EMULSION INTRAVENOUS at 10:13

## 2022-12-16 RX ADMIN — ACETAMINOPHEN 650 MG: 325 TABLET, FILM COATED ORAL at 21:11

## 2022-12-16 RX ADMIN — Medication 100 MCG: at 09:56

## 2022-12-16 ASSESSMENT — COGNITIVE AND FUNCTIONAL STATUS - GENERAL
TOILETING: A LITTLE
SUGGESTED CMS G CODE MODIFIER DAILY ACTIVITY: CK
CLIMB 3 TO 5 STEPS WITH RAILING: A LOT
MOVING FROM LYING ON BACK TO SITTING ON SIDE OF FLAT BED: A LITTLE
WALKING IN HOSPITAL ROOM: A LOT
DAILY ACTIVITIY SCORE: 16
EATING MEALS: A LITTLE
MOVING TO AND FROM BED TO CHAIR: A LITTLE
MOBILITY SCORE: 15
PERSONAL GROOMING: A LITTLE
DRESSING REGULAR LOWER BODY CLOTHING: A LOT
HELP NEEDED FOR BATHING: A LOT
SUGGESTED CMS G CODE MODIFIER MOBILITY: CK
TURNING FROM BACK TO SIDE WHILE IN FLAT BAD: A LITTLE
DRESSING REGULAR UPPER BODY CLOTHING: A LITTLE
STANDING UP FROM CHAIR USING ARMS: A LOT

## 2022-12-16 ASSESSMENT — ENCOUNTER SYMPTOMS
DOUBLE VISION: 0
COUGH: 0
NAUSEA: 0
VOMITING: 0
HEMOPTYSIS: 0
WHEEZING: 0
BLURRED VISION: 0
PND: 0
CLAUDICATION: 0
PALPITATIONS: 0
FEVER: 0
BRUISES/BLEEDS EASILY: 0
CHILLS: 0
DIZZINESS: 0
DEPRESSION: 0
BACK PAIN: 0
MYALGIAS: 0
HEARTBURN: 0
HEADACHES: 0

## 2022-12-16 ASSESSMENT — FIBROSIS 4 INDEX: FIB4 SCORE: 1.49

## 2022-12-16 ASSESSMENT — PAIN DESCRIPTION - PAIN TYPE
TYPE: ACUTE PAIN;SURGICAL PAIN
TYPE: ACUTE PAIN

## 2022-12-16 ASSESSMENT — PAIN SCALES - GENERAL: PAIN_LEVEL: 1

## 2022-12-16 NOTE — HOSPITAL COURSE
Katalina Correa is a 82 y.o. female with past medical history of CAD and hypertension who was [place]15[place] [facility] [place]ology [place].  CTA negative for dissection.  Cardiology consulted.  Per Cardiology, elevated troponin is Type 2 and not related to ACS; therefore, heparin drip discontinued.  Abdominal US showed cholecystitis.  HIDA positive.  Surgery consulted.  S/p lap isabel on 12/16.  Okay for discharge from surgery standpoint.  Surgeon, Dr Conteh, recommended OP CT triple phase of the liver to further evaluate hepatic hemangioma.  Continued to display acute respiratory failure with hypoxia.  CXR unremarkable.  Encouraged IS.  Therapies recommended home health.  Discharged with DME oxygen, pulse oximetry, home health, and referral to cardiology and PCP.  Recommended follow-up with surgeon, Dr. Conteh, Cardiology, and PCP.     oral

## 2022-12-16 NOTE — CARE PLAN
"The patient is Watcher - Medium risk of patient condition declining or worsening    Shift Goals  Clinical Goals: Monitor vitals & labs; keep NPO for surgery  Patient Goals: \"Feel better\"    Progress made toward(s) clinical / shift goals:        Problem: Knowledge Deficit - Standard  Goal: Patient and family/care givers will demonstrate understanding of plan of care, disease process/condition, diagnostic tests and medications  Outcome: Progressing  Note: Patient and family verbally demonstrates understanding of POC and disease process. All patient and family questions answered.     Problem: Fall Risk  Goal: Patient will remain free from falls  Outcome: Progressing  Note: All fall precautions in place and patient educated to use the call light before ambulation.         "

## 2022-12-16 NOTE — ANESTHESIA PROCEDURE NOTES
Airway    Date/Time: 12/16/2022 9:49 AM  Performed by: Ruperto Daly M.D.  Authorized by: Ruperto Daly M.D.     Location:  OR  Urgency:  Elective  Difficult Airway: No    Indications for Airway Management:  Anesthesia      Spontaneous Ventilation: absent    Sedation Level:  Deep  Preoxygenated: Yes    Patient Position:  Sniffing  Mask Difficulty Assessment:  0 - not attempted  Final Airway Type:  Endotracheal airway  Final Endotracheal Airway:  ETT  Cuffed: Yes    Technique Used for Successful ETT Placement:  Direct laryngoscopy  Devices/Methods Used in Placement:  Intubating stylet    Insertion Site:  Oral  Blade Type:  Martinez  Laryngoscope Blade/Videolaryngoscope Blade Size:  2  ETT Size (mm):  6.5  Measured from:  Teeth  ETT to Teeth (cm):  20  Placement Verified by: auscultation and capnometry    Cormack-Lehane Classification:  Grade IIb - view of arytenoids or posterior of glottis only  Number of Attempts at Approach:  1

## 2022-12-16 NOTE — PROGRESS NOTES
Hospital Medicine Daily Progress Note    Date of Service  12/16/2022    Chief Complaint  Katalina Correa is a 82 y.o. female admitted 12/15/2022 with chest pain    Hospital Course  Katalina Correa is a 82 y.o. female with past medical history of CAD, hypertension who was transferred on 12/15/2022 from [place] for NSTEMI and cardiology evaluation.     Patient reportedly had lower chest pain referring to her back and went to [place] for evaluation.  CT abdomen pelvis unremarkable for dissection.  Noted to have elevated troponin trended up to 100, EKG with  nonspecific ST changes.       Patient seen and examined bedside.  Currently asymptomatic.  She does reports of having MI 25 years ago but denies having any stent placed.  Reports she has history of hypertension but not been taking any antihypertensive medication.       Labs noted with worsening leukocytosis.  Ultrasound unremarkable for cholecystitis, CT abdomen with questionable cholecystitis.     Surgeon  Dr byrne consulted for evaluation.     Cardiology  Dr Calderon consulted for NSTEMI    Interval Problem Update  12/16 patient was seen in PACU she just finished cholecystectomy, patient is resting comfortably, she is receiving breathing treatments patient is able to wake up and she tells me that she is feeling okay no chest pain no abdominal pain she follows commands, discussed with PACU staff, continue supportive treatment.    I have discussed this patient's plan of care and discharge plan at IDT rounds today with Case Management, Nursing, Nursing leadership, and other members of the IDT team.    Consultants/Specialty  cardiology and general surgery    Code Status  DNAR/DNI    Disposition  Patient is not medically cleared for discharge.   Anticipate discharge to to home with close outpatient follow-up.  I have placed the appropriate orders for post-discharge needs.    Review of Systems  Review of Systems   Constitutional:  Negative for chills and fever.   Eyes:  Negative for  blurred vision and double vision.   Respiratory:  Negative for cough, hemoptysis and wheezing.    Cardiovascular:  Negative for chest pain, palpitations, claudication, leg swelling and PND.   Gastrointestinal:  Negative for heartburn, nausea and vomiting.   Genitourinary:  Negative for hematuria and urgency.   Musculoskeletal:  Negative for back pain and myalgias.   Skin:  Negative for rash.   Neurological:  Negative for dizziness and headaches.   Endo/Heme/Allergies:  Does not bruise/bleed easily.   Psychiatric/Behavioral:  Negative for depression.       Physical Exam  Temp:  [36.1 °C (97 °F)-37 °C (98.6 °F)] 36.8 °C (98.3 °F)  Pulse:  [] 100  Resp:  [11-20] 13  BP: ()/(39-82) 110/59  SpO2:  [90 %-95 %] 91 %    Physical Exam  Vitals and nursing note reviewed.   Constitutional:       General: She is not in acute distress.     Appearance: Normal appearance. She is not ill-appearing.   HENT:      Head: Normocephalic.      Mouth/Throat:      Pharynx: Oropharynx is clear.   Eyes:      General: No scleral icterus.        Right eye: No discharge.         Left eye: No discharge.      Conjunctiva/sclera: Conjunctivae normal.   Cardiovascular:      Rate and Rhythm: Normal rate and regular rhythm.      Pulses: Normal pulses.      Heart sounds: Normal heart sounds.   Pulmonary:      Effort: Pulmonary effort is normal. No respiratory distress.      Breath sounds: Normal breath sounds. No wheezing.   Abdominal:      General: There is no distension.      Palpations: Abdomen is soft.      Tenderness: There is no abdominal tenderness. There is no guarding.   Musculoskeletal:         General: Normal range of motion.      Cervical back: Normal range of motion and neck supple. No rigidity or tenderness.      Right lower leg: No edema.      Left lower leg: No edema.   Skin:     General: Skin is warm and dry.      Capillary Refill: Capillary refill takes less than 2 seconds.      Coloration: Skin is not jaundiced.    Neurological:      General: No focal deficit present.      Mental Status: She is alert and oriented to person, place, and time.      Cranial Nerves: No cranial nerve deficit.      Motor: No weakness.   Psychiatric:         Mood and Affect: Mood normal.       Fluids    Intake/Output Summary (Last 24 hours) at 12/16/2022 1332  Last data filed at 12/16/2022 1153  Gross per 24 hour   Intake 1234.01 ml   Output 300 ml   Net 934.01 ml       Laboratory  Recent Labs     12/15/22  0150 12/15/22  0958 12/16/22  0120   WBC 14.2* 17.0* 22.1*   RBC 4.67 4.42 4.28   HEMOGLOBIN 15.3 14.5 14.0   HEMATOCRIT 44.0 41.9 40.5   MCV 94.2 94.8 94.6   MCH 32.8* 32.8 32.7   MCHC 34.8 34.6 34.6   RDW 12.9 44.8 44.8   PLATELETCT 309 297 299   MPV 9.1 9.6 9.3     Recent Labs     12/15/22  0150 12/15/22  0958 12/16/22  0120   SODIUM 138 136 134*   POTASSIUM 3.9 4.3 3.6   CHLORIDE 101 103 102   CO2 22 21 21   GLUCOSE 139* 122* 118*   BUN 18 10 13   CREATININE 1.0 0.45* 0.46*   CALCIUM 9.8 8.9 8.9     Recent Labs     12/15/22  0150 12/15/22  1128   APTT 27.1 >240.0*   INR 0.92 1.11               Imaging  US-FOREIGN FILM ULTRASOUND   Final Result      CT-FOREIGN FILM CAT SCAN   Final Result      OUTSIDE IMAGES-DX CHEST   Final Result      NM-BILIARY (HIDA) SCAN WITH CCK   Final Result      Failure of the gallbladder to fill with radiotracer despite morphine administration. Findings are suspicious for acute cholecystitis.         EC-ECHOCARDIOGRAM COMPLETE W/O CONT   Final Result           Assessment/Plan  * NSTEMI (non-ST elevated myocardial infarction) (HCC)  Assessment & Plan    Telemetry monitoring  Follow serial troponin/CK-MB/EKG  Transthoracic echocardiograph  O2 2L per nasal cannula to keep saturation more than 92%  Loading with aspirin 325 mg first now if not already given, then aspirin 81 mg daily  On simvastatin   Metoprolol   Nitropatch PRN  NPO    Cardiology consulted  Check lipid panel  CBC/BMP AM  Per cardiology likely type  II    Advance care planning  Assessment & Plan  I had a prolonged discussion with the patient  regarding goals of care, diagnoses, prognosis, and CODE STATUS. We discussed  her prognosis and comorbidities. I spent 11 minutes on advanced care planning in addition to the time spent managing the other medical problems.      She requested for DNR/DNI    Chest pain, atypical- (present on admission)  Assessment & Plan  The ASCVD Risk score (Tex DK, et al., 2019) failed to calculate for the following reasons:    The 2019 ASCVD risk score is only valid for ages 40 to 79    The patient has a prior MI or stroke diagnosis      Leucocytosis  Assessment & Plan  Stress induced, afebrile, also likely from underlying cholecystitis  No sign of active infection  Remain afebrile  Chest x-ray unremarkable  Check UA   Check procalcitonin      Hypertension  Assessment & Plan  Not on antihypertensive  Start on Norvasc, lisinopril  On IV labetalol as needed  Monitor BP closely      Calculus of gallbladder  Assessment & Plan  Concern cholecystitis given right upper quadrant tenderness, leukocytosis  CT abdomen consistent with cholecystitis  Surgeon  Dr byrne consulted for evaluation  HIDA scan was positive, patient was taken to the OR this morning.    Pain in the chest  Assessment & Plan    -telemetry monitoring  -Follow serial troponin/CK-MB/EKG  -Transthoracic echocardiograph to identify regional wall motion abnormalities and assess LV function  -Stress test  -Aspirin 325 mg  -Heparin drip  -NPO for possible Cath  -Cardiology consulted   -Check lipid panel  -Supplemental oxygen  Resolved likely due to acute cholecystitis               VTE prophylaxis: SCDs/TEDs    I have performed a physical exam and reviewed and updated ROS and Plan today (12/16/2022). In review of yesterday's note (12/15/2022), there are no changes except as documented above.

## 2022-12-16 NOTE — PROGRESS NOTES
Pt returned from hida scan. Results discussed with family, patient, RN and MD via phone. NPO at midnight for possible OR tomorrow for dx of acute cholecystitis. Pt denies chest pain but was medicated with Zofran for nausea and GI cocktail for indigestion.

## 2022-12-16 NOTE — ANESTHESIA POSTPROCEDURE EVALUATION
Patient: Katalina Correa    Procedure Summary     Date: 12/16/22 Room / Location: [facility] 09 / SURGERY [facility]    Anesthesia Start: 0945 Anesthesia Stop: 1153    Procedure: CHOLECYSTECTOMY, LAPAROSCOPIC (Abdomen) Diagnosis: (acute cholecystitis)    Surgeons: Roni Conteh M.D. Responsible Provider: Ruperto Daly M.D.    Anesthesia Type: general ASA Status: 4 - Emergent          Final Anesthesia Type: general  Last vitals  BP   Blood Pressure : 101/55    Temp   36.7 °C (98.1 °F)    Pulse   (!) 104   Resp   14    SpO2   95%      Anesthesia Post Evaluation    Patient location during evaluation: PACU  Patient participation: complete - patient participated  Level of consciousness: awake  Pain score: 1    Airway patency: patent  Anesthetic complications: no  Cardiovascular status: hemodynamically stable and tachycardic  Respiratory status: acceptable and face mask  Hydration status: euvolemic    PONV: none          No notable events documented.     Nurse Pain Score: 3 (NPRS)

## 2022-12-16 NOTE — PROGRESS NOTES
Pts son, Jarek added to emergency contacts with pt permission. Son requesting to be updated with surgery time when available.

## 2022-12-16 NOTE — OP REPORT
DATE OF OPERATION:   12/16/2022     PREOPERATIVE DIAGNOSIS: acute cholecystitis.    POSTOPERATIVE DIAGNOSIS: acute cholecystitis and hydrops.    PROCEDURE PERFORMED: laparoscopic cholecystectomy    SURGEON:    Roni Conteh M.D.    ASSISTANT:    HERBIE Galvan.      ANESTHESIOLOGIST:  Ruperto Daly M.D.    ANESTHESIA:   General endotracheal anesthesia.    ASA CLASSIFICATION:  IV. Emergent.    INDICATIONS: The patient is a 82 year-old elderly woman who was transferred to [facility] with concern for an NSTEMI, after cardiology consultation was not felt to be having an acute myocardial infarction however she was found to have clinical and radiographic findings of acute cholecystitis. She is taken to the operating room for planned laparoscopic cholecystectomy     The nature of the surgical procedure warranted additional skilled operative assistance from an Advanced Registered Nurse Practitioner (ARNP). The assistant was present during the entire operation. The surgical assistant performed the following: provided assistance with optimal surgical exposure of the operative field, operated the camera for the laparoscopic portion of the procedure, and performed the skin closure and dressing application.     FINDINGS: Gallbladder wall thickening and acute inflammation. Gallbladder hydrops. Large right-sided hepatic hemangioma.    WOUND CLASSIFICATION:  Class II, Clean Contaminated.    SPECIMEN:    Gallbladder.    ESTIMATED BLOOD LOSS:  100 mL.    PROCEDURE: Following informed consent, the patient was properly identified, taken to the operating room and placed in supine position where general endotracheal anesthesia was administered. Intravenous antibiotics were administered by the anesthesiologist in correct time interval. A Hawk catheter was aseptically placed. Sequential compression devices were employed. The abdomen was prepped and draped into a sterile field. A timeout was conducted with the full attention and  participation of all operating room personnel.    Marcaine 0.5% was used to infiltrate the port sites. A 5 mm supraumbilical midline incision was made and the subcutaneous tissues spread bluntly. The fascia was elevated Kocher clamps and sharply incised. A Haley trocar was inserted and carbon dioxide pneumoperitoneum was instilled. A 5 mm 30 degree lens and camera was passed into the peritoneal cavity, initial diagnostic laparoscopy found no evidence of injury at the site of entry into the abdominal cavity. An 11 mm port was placed in the epigastric midline under direct vision. Two 5 mm right subcostal ports were placed under direct vision.     The gallbladder was identified and elevated. It was aspirated in order to grasp it effectively, during aspiration it was noted to contain white bile. Dissection was carried out to completely expose and delineate the hepatocystic triangle. The critical view was achieved definitively identifying the single cystic duct and single cystic artery entering the gallbladder. Two additional smaller arterial structures were clipped and divided in proximity to the gallbladder adjacent to the node of Calot, these were likely branches of the cystic artery. These structures were multiply clipped proximally, once distally and divided. The gallbladder was dissected free from the undersurface of the liver using electrocautery and placed within a laparoscopic specimen retrieval bag. The gallbladder was delivered intact from the abdominal cavity and submitted for pathology. The gallbladder fossa was inspected. Hemostasis was controlled with electrocautery. While retracting the liver to achieve hemostasis of the gallbladder fossa pressure was placed on the patient's hemangioma which began bleeding at the site. Hemostasis was achieved with electrocautery. The gallbladder fossa was irrigated. All excess irrigant was evacuated from the abdominal cavity.      The epigastric port site fascia was  approximated with a running 0 VICRYL® Plus Antibacterial suture. The supraumbilical port site fascia was approximated with a figure-of-eight 0 VICRYL® Plus Antibacterial suture. The abdomen was desufflated and the ports were removed.  The port site skin incisions were closed with interrupted 4-0 MONOCRYL® subcuticular sutures. A DERMABOND ADVANCED® Topical Skin Adhesive dressing was applied.    The patient tolerated the procedure well, and there were no apparent complications. All sponge, needle, and instrument counts were correct on 2 separate occasions. The patient was awakened, extubated, and transferred to   the post anesthesia care unit (PACU) in satisfactory condition.       ____________________________________     Roni Conteh M.D.    DD: 12/16/2022  11:54 AM

## 2022-12-16 NOTE — PROGRESS NOTES
PREOPERATIVE NOTE: I have seen and examined the patient today.  Critical physical examination findings, laboratory indices, and radiographic studies reviewed.  I recommend  laparoscopic, possible open cholecystectomy.    The operative strategy was explained and reviewed. Alternatives discussed. Potential complications including, but not limited to: infection; bleeding; damage to adjacent structures, such as the common bile duct; the need for an open procedures; the need for additional procedures, such as intra-operative cholangiography; and anesthetic complications were discussed. Questions were elicited and answered to the patient's satisfaction.  Operative consent signed.        ____________________________________     Roni Conteh M.D.    DD: 12/16/2022  9:29 AM

## 2022-12-16 NOTE — DISCHARGE PLANNING
Case Management Discharge Planning    Admission Date: 12/15/2022  GMLOS: 2.4  ALOS: 1    6-Clicks ADL Score:  none  6-Clicks Mobility Score:  none      Anticipated Discharge Dispo:  DC to home    DME Needed:     Action(s) Taken: Pt pending medical clearance, pt [place] [facility] for chest pain which was ruled out of cardiac in nature. General surgery following for cholecystitis. Pt currently on 3 liters O2, case management needs pending clinical course.    Escalations Completed: Speciality Provider    Medically Clear: No    Next Steps: f/u with medical team and pt to discuss dc needs and barriers.    Barriers to Discharge: Medical clearance

## 2022-12-16 NOTE — ANESTHESIA PREPROCEDURE EVALUATION
Case: [id number] Date/Time: 12/16/22 0916    Procedure: CHOLECYSTECTOMY, LAPAROSCOPIC    Location: [facility] OR 09 / SURGERY [facility]    Surgeons: Roni Conteh M.D.          Relevant Problems   CARDIAC   (positive) Hypertension   (positive) NSTEMI (non-ST elevated myocardial infarction) (HCC)      Other   (positive) Advance care planning   (positive) Chest pain, atypical       Physical Exam    Airway   Mallampati: III  TM distance: >3 FB  Neck ROM: limited       Cardiovascular - normal exam  Rhythm: regular  Rate: normal  (+) weak pulses     Dental - normal exam      Very poor dentition   Pulmonary   (+) decreased breath sounds     Abdominal   Abdomen: tender     Neurological - normal exam                 Anesthesia Plan    ASA 4- EMERGENT   ASA physical status 4 criteria: MI - recent (< 3 months)ASA physical status emergent criteria: acutely contaminated wound or identified infection source    Plan - general       Airway plan will be ETT          Induction: intravenous    Postoperative Plan: Postoperative administration of opioids is intended.    Pertinent diagnostic labs and testing reviewed    Informed Consent:    Anesthetic plan and risks discussed with patient.    Use of blood products discussed with: patient whom consented to blood products.       NSTEMI with type II MI, normal EF on echo with RV dilation and dilated IVC.  HTN, acute cholecystitis with necrotic gallbladder.  DNR/DNI will be suspended for surgery.

## 2022-12-16 NOTE — PROGRESS NOTES
Given intra-operative oozing attributed to recent aspirin use as well as bleeding from right-sided hemangioma controlled with electrocautery recommend holding prophylactic anticoagulation for venous thromboembolism as well as any antiplatelet agents in the immediate perioperative period. Will monitor the patient's hemoglobin and resume when able.    Roni Conteh MD

## 2022-12-16 NOTE — PROGRESS NOTES
Patient back from PACU. Patient is A&O X 4 and is reporting 3/10 shoulder pain. Medicated per MAR. Vitals stable, on 3L NC. Placed back on tele monitor, monitor room notified. 4 laps sites, CDI.

## 2022-12-16 NOTE — CONSULTS
Cardiology Consult Note:    Seth Calderon M.D.  Date & Time note created:    12/15/2022   4:56 PM     Referring MD:  Dr. Coy    Patient ID:   Name:             Katalina Correa     YOB: 1940  Age:                 82 y.o.  female   MRN:               [id number]                                                             Reason for Consult:      Pos trop    History of Present Illness:    82-year-old female with no significant past medical history.  She was told years ago that she had a heart attack based upon symptomatology and possibly an event recorder.  She is never undergone a cardiac catheterization.  She developed acute onset of mid epigastric pain with radiation through her back.  She described as a heartburn-like sensation.  She does have chronic heartburn at home.  This time is rating to the right upper quadrant.  She underwent an echocardiogram which was indeterminate but possibly showing cholecystitis.  As part of her work-up she had a troponin which was borderline indeterminant.  She is pending a HIDA scan.  Other than that she denies cardiac pain.  She is not having any exertional angina.  She has a positive Pleurx on the right upper quadrant.    Review of Systems:      Constitutional: Denies fevers, Denies weight changes  Eyes: Denies changes in vision, no eye pain  Ears/Nose/Throat/Mouth: Denies nasal congestion or sore throat   Cardiovascular: no chest pain, no palpitations   Respiratory: no shortness of breath , Denies cough  Gastrointestinal/Hepatic: Denies abdominal pain, nausea, vomiting, diarrhea, constipation or GI bleeding   Genitourinary: Denies dysuria or frequency  Musculoskeletal/Rheum: Denies  joint pain and swelling, noedema  Skin: Denies rash  Neurological: Denies headache, confusion, memory loss or focal weakness/parasthesias  Psychiatric: denies mood disorder   Endocrine: Ciarra thyroid problems  Heme/Oncology/Lymph Nodes: Denies enlarged lymph nodes, denies brusing  or known bleeding disorder  All other systems were reviewed and are negative (AMA/CMS criteria)                Past Medical History:   Past Medical History:   Diagnosis Date    Abnormal EKG     Hyperlipidemia     Hypertension     Reflux      Active Hospital Problems    Diagnosis     Pain in the chest [R07.9]     NSTEMI (non-ST elevated myocardial infarction) (HCC) [I21.4]     Calculus of gallbladder [K80.20]     Hypertension [I10]     Leucocytosis [D72.829]     Chest pain, atypical [R07.89]     Advance care planning [Z71.89]        Past Surgical History:  Past Surgical History:   Procedure Laterality Date    GYN SURGERY      ectopic x 2    GYN SURGERY      breast reduction    HYSTERECTOMY LAPAROSCOPY      TONSILLECTOMY AND ADENOIDECTOMY         Hospital Medications:  Current Facility-Administered Medications   Medication Dose    heparin infusion 25,000 units in 500 mL 0.45% NACL  0-30 Units/kg/hr (Adjusted)    heparin injection 2,500 Units  40 Units/kg (Adjusted)    aspirin (ASA) tablet 325 mg  325 mg    Or    aspirin (ASA) chewable tab 324 mg  324 mg    Or    aspirin (ASA) suppository 300 mg  300 mg    metoprolol SR (TOPROL XL) tablet 25 mg  25 mg    omeprazole (PRILOSEC) capsule 20 mg  20 mg    simvastatin (ZOCOR) tablet 20 mg  20 mg    senna-docusate (PERICOLACE or SENOKOT S) 8.6-50 MG per tablet 2 Tablet  2 Tablet    And    polyethylene glycol/lytes (MIRALAX) PACKET 1 Packet  1 Packet    And    magnesium hydroxide (MILK OF MAGNESIA) suspension 30 mL  30 mL    And    bisacodyl (DULCOLAX) suppository 10 mg  10 mg    acetaminophen (Tylenol) tablet 650 mg  650 mg    labetalol (NORMODYNE/TRANDATE) injection 10 mg  10 mg    ondansetron (ZOFRAN) syringe/vial injection 4 mg  4 mg    ondansetron (ZOFRAN ODT) dispertab 4 mg  4 mg    amLODIPine (NORVASC) tablet 10 mg  10 mg    lisinopril (PRINIVIL) tablet 5 mg  5 mg     Current Outpatient Medications   Medication    acetaminophen (TYLENOL) 500 MG Tab    omeprazole  "(PRILOSEC) 20 MG delayed-release capsule         Current Outpatient Medications:  (Not in a hospital admission)      Medication Allergy:  No Known Allergies    Family History:  History reviewed. No pertinent family history.    Social History:  Social History     Socioeconomic History    Marital status:      Spouse name: Not on file    Number of children: Not on file    Years of education: Not on file    Highest education level: Not on file   Occupational History    Not on file   Tobacco Use    Smoking status: Never    Smokeless tobacco: Not on file   Vaping Use    Vaping Use: Never used   Substance and Sexual Activity    Alcohol use: Yes     Alcohol/week: 4.2 oz     Types: 7 Glasses of wine per week     Comment: nightly glass of wine    Drug use: No    Sexual activity: Not on file   Other Topics Concern    Not on file   Social History Narrative    Not on file     Social Determinants of Health     Financial Resource Strain: Not on file   Food Insecurity: Not on file   Transportation Needs: Not on file   Physical Activity: Not on file   Stress: Not on file   Social Connections: Not on file   Intimate Partner Violence: Not on file   Housing Stability: Not on file         Physical Exam:  Vitals/ General Appearance:   Weight/BMI: Body mass index is 27.46 kg/m².  BP (!) 183/86   Pulse 93   Temp 36.6 °C (97.8 °F) (Temporal)   Resp (!) 32   Ht 1.626 m (5' 4\")   Wt 72.6 kg (160 lb)   SpO2 97%   Vitals:    12/15/22 1024 12/15/22 1039 12/15/22 1109 12/15/22 1124   BP:  (!) 176/87 (!) 183/86    Pulse: 93 96 96 93   Resp: (!) 26 19 17 (!) 32   Temp:       TempSrc:       SpO2: 97% 96% 95% 97%   Weight:       Height:         Oxygen Therapy:  Pulse Oximetry: 97 %, O2 (LPM): 2, O2 Delivery Device: Nasal Cannula    Constitutional:   Well developed, Well nourished, No acute distress  HENMT:  Normocephalic, Atraumatic, Oropharynx moist mucous membranes, No oral exudates, Nose normal.  No thyromegaly.  Eyes:  EOMI, " Conjunctiva normal, No discharge.  Neck:  Normal range of motion, No cervical tenderness,  no JVD.  Cardiovascular:  Normal heart rate, Normal rhythm, No murmurs, No rubs, No gallops.   Extremitites with intact distal pulses, no cyanosis, or edema.  Lungs:  Normal breath sounds, breath sounds clear to auscultation bilaterally,  no crackles, no wheezing.   Abdomen: Bowel sounds normal, Soft, No tenderness, No guarding, No rebound, No masses, No hepatosplenomegaly.  Skin: Warm, Dry, No erythema, No rash, no induration.  Neurologic: Alert & oriented x 3, No focal deficits noted, cranial nerves II through X are grossly intact.  Psychiatric: Affect normal, Judgment normal, Mood normal.      MDM (Data Review):     Records reviewed and summarized in current documentation    Lab Data Review:  Recent Results (from the past 24 hour(s))   CBC WITH DIFFERENTIAL    Collection Time: 12/15/22  1:50 AM   Result Value Ref Range    WBC 14.2 (H) 4.8 - 10.8 K/uL    RBC 4.67 4.20 - 5.40 M/uL    Hemoglobin 15.3 13.0 - 17.0 g/dL    Hematocrit 44.0 39.0 - 50.0 %    MCV 94.2 81.0 - 99.0 fL    MCH 32.8 (H) 27.0 - 31.0 pg    MCHC 34.8 33.0 - 37.0 g/dL    RDW 12.9 11.5 - 14.5 %    Platelet Count 309 130 - 400 K/uL    MPV 9.1 7.4 - 10.4 fL    Neutrophils Automated 77.3 (H) 39.0 - 70.0 %    Lymphocytes Automated 16.6 (L) 21.0 - 50.0 %    Monocytes Automated 5.0 2.0 - 9.0 %    Eosinophils Automated 0.3 0.0 - 5.0 %    Basophils Automated 0.4 0.0 - 3.0 %    Abs Neutrophils Automated 10.9 (H) 1.8 - 7.7 K/uL    Abs Lymph Automated 2.4 1.2 - 4.8 K/uL    Eosinophil Count, Blood 0.04 0.00 - 0.50 K/uL   BASIC METABOLIC PANEL    Collection Time: 12/15/22  1:50 AM   Result Value Ref Range    Sodium 138 136 - 145 mmol/L    Potassium 3.9 3.5 - 5.1 mmol/L    Chloride 101 98 - 107 mmol/L    Co2 22 21 - 32 mmol/L    Glucose 139 (H) 74 - 99 mg/dL    Bun 18 7 - 18 mg/dL    Creatinine 1.0 0.6 - 1.0 mg/dL    Calcium 9.8 8.5 - 11.0 mg/dL    Anion Gap 19 (H) 10 - 18  mmol/L   HEPATIC FUNCTION PANEL    Collection Time: 12/15/22  1:50 AM   Result Value Ref Range    Alkaline Phosphatase 100 46 - 116 U/L    AST(SGOT) 19 15 - 37 U/L    ALT(SGPT) 25 12 - 78 U/L    Total Bilirubin 0.3 0.2 - 1.0 mg/dL    Direct Bilirubin <0.2 0.0 - 0.2 mg/dL    Indirect Bilirubin 0.1 0.1 - 0.9 mg/dL    Albumin 4.1 3.4 - 5.0 g/dL    Total Protein 7.8 6.4 - 8.2 g/dL   TROPONIN    Collection Time: 12/15/22  1:50 AM   Result Value Ref Range    Troponin I 62.9 (HH) 0.0 - 60.3 pg/mL   PROTHROMBIN TIME    Collection Time: 12/15/22  1:50 AM   Result Value Ref Range    PT 9.9 9.3 - 11.7 sec    INR 0.92    APTT    Collection Time: 12/15/22  1:50 AM   Result Value Ref Range    APTT 27.1 22.8 - 31.5 sec   ESTIMATED GFR    Collection Time: 12/15/22  1:50 AM   Result Value Ref Range    GFR (CKD-EPI) 57 (A) >60 mL/min/1.73 m 2   LIPASE    Collection Time: 12/15/22  1:50 AM   Result Value Ref Range    Lipase 117 73 - 393 U/L   TROPONIN    Collection Time: 12/15/22  3:50 AM   Result Value Ref Range    Troponin I 106.7 (HH) 0.0 - 60.3 pg/mL   CoV-2, Flu A/B, And RSV by PCR ([place])    Collection Time: 12/15/22  5:15 AM    Specimen: Nasopharyngeal; Respirate   Result Value Ref Range    Influenza virus A RNA Negative Negative    Influenza virus B, PCR Negative Negative    RSV, PCR Not Detected Not Detected    SARS-CoV-2 by PCR Negative Negative    SARS-CoV-2 Source NP    EKG (NOW)    Collection Time: 12/15/22  9:09 AM   Result Value Ref Range    Report       [facility] Emergency Dept.    Test Date:  2022-12-15  Pt Name:    JUJU ANSARI                 Department: ER  MRN:        [id number]                      Room:       [facility]  Gender:     Female                       Technician: [id number]  :        [id number]                   Requested By:ER TRIAGE PROTOCOL  Order #:    [id number]                    Pb MD: Jarad Tilley MD    Measurements  Intervals                                Axis  Rate:        94                           P:          28  DE:         182                          QRS:        -60  QRSD:       87                           T:          14  QT:         381  QTc:        477    Interpretive Statements  EKG is normal sinus rhythm, Q waves present in inferior leads, no ST  elevation  or depression  Electronically Signed On 12- 9:38:28 PST by Jarad Tilley MD     TROPONIN    Collection Time: 12/15/22  9:58 AM   Result Value Ref Range    Troponin T 31 (H) 6 - 19 ng/L   LIPASE    Collection Time: 12/15/22  9:58 AM   Result Value Ref Range    Lipase 16 11 - 82 U/L   CBC WITH DIFFERENTIAL    Collection Time: 12/15/22  9:58 AM   Result Value Ref Range    WBC 17.0 (H) 4.8 - 10.8 K/uL    RBC 4.42 4.20 - 5.40 M/uL    Hemoglobin 14.5 12.0 - 16.0 g/dL    Hematocrit 41.9 37.0 - 47.0 %    MCV 94.8 81.4 - 97.8 fL    MCH 32.8 27.0 - 33.0 pg    MCHC 34.6 33.6 - 35.0 g/dL    RDW 44.8 35.9 - 50.0 fL    Platelet Count 297 164 - 446 K/uL    MPV 9.6 9.0 - 12.9 fL    Neutrophils-Polys 81.90 (H) 44.00 - 72.00 %    Lymphocytes 11.50 (L) 22.00 - 41.00 %    Monocytes 5.60 0.00 - 13.40 %    Eosinophils 0.10 0.00 - 6.90 %    Basophils 0.30 0.00 - 1.80 %    Immature Granulocytes 0.60 0.00 - 0.90 %    Nucleated RBC 0.00 /100 WBC    Neutrophils (Absolute) 13.95 (H) 2.00 - 7.15 K/uL    Lymphs (Absolute) 1.96 1.00 - 4.80 K/uL    Monos (Absolute) 0.95 (H) 0.00 - 0.85 K/uL    Eos (Absolute) 0.01 0.00 - 0.51 K/uL    Baso (Absolute) 0.05 0.00 - 0.12 K/uL    Immature Granulocytes (abs) 0.10 0.00 - 0.11 K/uL    NRBC (Absolute) 0.00 K/uL   CMP    Collection Time: 12/15/22  9:58 AM   Result Value Ref Range    Sodium 136 135 - 145 mmol/L    Potassium 4.3 3.6 - 5.5 mmol/L    Chloride 103 96 - 112 mmol/L    Co2 21 20 - 33 mmol/L    Anion Gap 12.0 7.0 - 16.0    Glucose 122 (H) 65 - 99 mg/dL    Bun 10 8 - 22 mg/dL    Creatinine 0.45 (L) 0.50 - 1.40 mg/dL    Calcium 8.9 8.5 - 10.5 mg/dL    AST(SGOT) 23 12 - 45 U/L    ALT(SGPT) 17 2 - 50  U/L    Alkaline Phosphatase 90 30 - 99 U/L    Total Bilirubin 0.3 0.1 - 1.5 mg/dL    Albumin 4.2 3.2 - 4.9 g/dL    Total Protein 7.0 6.0 - 8.2 g/dL    Globulin 2.8 1.9 - 3.5 g/dL    A-G Ratio 1.5 g/dL   PROCALCITONIN    Collection Time: 12/15/22  9:58 AM   Result Value Ref Range    Procalcitonin 0.05 <0.25 ng/mL   CORRECTED CALCIUM    Collection Time: 12/15/22  9:58 AM   Result Value Ref Range    Correct Calcium 8.7 8.5 - 10.5 mg/dL   ESTIMATED GFR    Collection Time: 12/15/22  9:58 AM   Result Value Ref Range    GFR (CKD-EPI) 97 >60 mL/min/1.73 m 2   TROPONIN    Collection Time: 12/15/22 11:28 AM   Result Value Ref Range    Troponin T 29 (H) 6 - 19 ng/L   Prothrombin Time    Collection Time: 12/15/22 11:28 AM   Result Value Ref Range    PT 14.2 12.0 - 14.6 sec    INR 1.11 0.87 - 1.13   APTT    Collection Time: 12/15/22 11:28 AM   Result Value Ref Range    APTT >240.0 (HH) 24.7 - 36.0 sec   Heparin Anti-Xa    Collection Time: 12/15/22 11:28 AM   Result Value Ref Range    Heparin Xa (UFH) >1.10 (HH) IU/mL   PROCALCITONIN    Collection Time: 12/15/22 11:28 AM   Result Value Ref Range    Procalcitonin 0.06 <0.25 ng/mL   EC-ECHOCARDIOGRAM COMPLETE W/O CONT    Collection Time: 12/15/22  2:20 PM   Result Value Ref Range    Eject.Frac. MOD BP 54.47     Eject.Frac. MOD 4C 63.16     Eject.Frac. MOD 2C 42.91     Left Ventrical Ejection Fraction 60        Imaging/Procedures Review:    Chest Xray:  Reviewed    EKG:   Dated 12/15/2022 personally viewed inter myself showing normal sinus rhythm nonspecific ST segment changes.    ECHO:  Pending  MDM (Assessment and Plan):     Active Hospital Problems    Diagnosis     Pain in the chest [R07.9]     NSTEMI (non-ST elevated myocardial infarction) (HCC) [I21.4]     Calculus of gallbladder [K80.20]     Hypertension [I10]     Leucocytosis [D72.829]     Chest pain, atypical [R07.89]     Advance care planning [Z71.89]      82-year-old female with a positive troponin in the setting of likely  cholecystitis.  Her HIDA scan is now back which is positive.  Her echocardiogram is ordered which is now back showing no focal wall motion abnormalities.  I think her troponin is a type II and not related to ACS.  Therefore we can stop the heparin drip.  In terms of her surgical risk this does not place her at any higher surgical risk.

## 2022-12-16 NOTE — OR NURSING
Report called to Quiana NBOLE. Plan of care discussed. Patient denies pain or nausea. VSS. Surgical sites CDI. Ice pack in place. Patient using IS. Dozing intermittently.

## 2022-12-16 NOTE — PROGRESS NOTES
4 Eyes Skin Assessment Completed by Sonia RN and Quiana RN.    Head WDL  Ears Redness and Blanching  Nose WDL  Mouth WDL  Neck WDL  Breast/Chest WDL  Shoulder Blades WDL  Spine WDL  (R) Arm/Elbow/Hand Scab  (L) Arm/Elbow/Hand WDL  Abdomen WDL  Groin WDL  Scrotum/Coccyx/Buttocks WDL  (R) Leg WDL  (L) Leg WDL  (R) Heel/Foot/Toe WDL, dry flaky  (L) Heel/Foot/Toe WDL, dry flaky          Devices In Places Tele Box, Blood Pressure Cuff, and Nasal Cannula      Interventions In Place Pillows    Possible Skin Injury No    Pictures Uploaded Into Epic N/A  Wound Consult Placed N/A  RN Wound Prevention Protocol Ordered No

## 2022-12-17 PROBLEM — D64.9 ANEMIA: Status: ACTIVE | Noted: 2022-12-17

## 2022-12-17 LAB
ANION GAP SERPL CALC-SCNC: 9 MMOL/L (ref 7–16)
BUN SERPL-MCNC: 16 MG/DL (ref 8–22)
CALCIUM SERPL-MCNC: 8.8 MG/DL (ref 8.5–10.5)
CHLORIDE SERPL-SCNC: 102 MMOL/L (ref 96–112)
CO2 SERPL-SCNC: 24 MMOL/L (ref 20–33)
CREAT SERPL-MCNC: 0.6 MG/DL (ref 0.5–1.4)
ERYTHROCYTE [DISTWIDTH] IN BLOOD BY AUTOMATED COUNT: 47.9 FL (ref 35.9–50)
GFR SERPLBLD CREATININE-BSD FMLA CKD-EPI: 90 ML/MIN/1.73 M 2
GLUCOSE SERPL-MCNC: 131 MG/DL (ref 65–99)
HCT VFR BLD AUTO: 31.2 % (ref 37–47)
HCT VFR BLD AUTO: 34.1 % (ref 37–47)
HCT VFR BLD AUTO: 35.3 % (ref 37–47)
HGB BLD-MCNC: 10.4 G/DL (ref 12–16)
HGB BLD-MCNC: 11.1 G/DL (ref 12–16)
HGB BLD-MCNC: 11.4 G/DL (ref 12–16)
MCH RBC QN AUTO: 32.1 PG (ref 27–33)
MCHC RBC AUTO-ENTMCNC: 32.3 G/DL (ref 33.6–35)
MCV RBC AUTO: 99.4 FL (ref 81.4–97.8)
PLATELET # BLD AUTO: 247 K/UL (ref 164–446)
PMV BLD AUTO: 9.6 FL (ref 9–12.9)
POTASSIUM SERPL-SCNC: 3.8 MMOL/L (ref 3.6–5.5)
RBC # BLD AUTO: 3.55 M/UL (ref 4.2–5.4)
SODIUM SERPL-SCNC: 135 MMOL/L (ref 135–145)
WBC # BLD AUTO: 16.3 K/UL (ref 4.8–10.8)

## 2022-12-17 PROCEDURE — 700105 HCHG RX REV CODE 258: Performed by: STUDENT IN AN ORGANIZED HEALTH CARE EDUCATION/TRAINING PROGRAM

## 2022-12-17 PROCEDURE — 700111 HCHG RX REV CODE 636 W/ 250 OVERRIDE (IP): Performed by: STUDENT IN AN ORGANIZED HEALTH CARE EDUCATION/TRAINING PROGRAM

## 2022-12-17 PROCEDURE — A9270 NON-COVERED ITEM OR SERVICE: HCPCS | Performed by: STUDENT IN AN ORGANIZED HEALTH CARE EDUCATION/TRAINING PROGRAM

## 2022-12-17 PROCEDURE — 99232 SBSQ HOSP IP/OBS MODERATE 35: CPT | Performed by: HOSPITALIST

## 2022-12-17 PROCEDURE — 85014 HEMATOCRIT: CPT | Mod: 91

## 2022-12-17 PROCEDURE — 85027 COMPLETE CBC AUTOMATED: CPT

## 2022-12-17 PROCEDURE — 51798 US URINE CAPACITY MEASURE: CPT

## 2022-12-17 PROCEDURE — 36415 COLL VENOUS BLD VENIPUNCTURE: CPT

## 2022-12-17 PROCEDURE — 99024 POSTOP FOLLOW-UP VISIT: CPT | Performed by: REGISTERED NURSE

## 2022-12-17 PROCEDURE — A9270 NON-COVERED ITEM OR SERVICE: HCPCS | Performed by: HOSPITALIST

## 2022-12-17 PROCEDURE — 770001 HCHG ROOM/CARE - MED/SURG/GYN PRIV*

## 2022-12-17 PROCEDURE — 700101 HCHG RX REV CODE 250: Performed by: HOSPITALIST

## 2022-12-17 PROCEDURE — 700102 HCHG RX REV CODE 250 W/ 637 OVERRIDE(OP): Performed by: STUDENT IN AN ORGANIZED HEALTH CARE EDUCATION/TRAINING PROGRAM

## 2022-12-17 PROCEDURE — 80048 BASIC METABOLIC PNL TOTAL CA: CPT

## 2022-12-17 PROCEDURE — 700102 HCHG RX REV CODE 250 W/ 637 OVERRIDE(OP): Performed by: HOSPITALIST

## 2022-12-17 PROCEDURE — 85018 HEMOGLOBIN: CPT

## 2022-12-17 RX ORDER — LIDOCAINE 50 MG/G
1 PATCH TOPICAL EVERY 24 HOURS
Status: DISCONTINUED | OUTPATIENT
Start: 2022-12-17 | End: 2022-12-19 | Stop reason: HOSPADM

## 2022-12-17 RX ORDER — TRAMADOL HYDROCHLORIDE 50 MG/1
50 TABLET ORAL EVERY 6 HOURS PRN
Status: DISCONTINUED | OUTPATIENT
Start: 2022-12-17 | End: 2022-12-18

## 2022-12-17 RX ADMIN — TRAMADOL HYDROCHLORIDE 50 MG: 50 TABLET ORAL at 19:41

## 2022-12-17 RX ADMIN — AMPICILLIN AND SULBACTAM 3 G: 1; 2 INJECTION, POWDER, FOR SOLUTION INTRAMUSCULAR; INTRAVENOUS at 12:37

## 2022-12-17 RX ADMIN — AMPICILLIN AND SULBACTAM 3 G: 1; 2 INJECTION, POWDER, FOR SOLUTION INTRAMUSCULAR; INTRAVENOUS at 23:30

## 2022-12-17 RX ADMIN — LISINOPRIL 5 MG: 10 TABLET ORAL at 04:23

## 2022-12-17 RX ADMIN — AMPICILLIN AND SULBACTAM 3 G: 1; 2 INJECTION, POWDER, FOR SOLUTION INTRAMUSCULAR; INTRAVENOUS at 04:28

## 2022-12-17 RX ADMIN — SIMVASTATIN 20 MG: 20 TABLET, FILM COATED ORAL at 19:41

## 2022-12-17 RX ADMIN — AMPICILLIN AND SULBACTAM 3 G: 1; 2 INJECTION, POWDER, FOR SOLUTION INTRAMUSCULAR; INTRAVENOUS at 00:41

## 2022-12-17 RX ADMIN — SENNOSIDES AND DOCUSATE SODIUM 2 TABLET: 50; 8.6 TABLET ORAL at 04:36

## 2022-12-17 RX ADMIN — LIDOCAINE 1 PATCH: 700 PATCH TOPICAL at 12:28

## 2022-12-17 RX ADMIN — ACETAMINOPHEN 650 MG: 325 TABLET, FILM COATED ORAL at 04:23

## 2022-12-17 RX ADMIN — AMPICILLIN AND SULBACTAM 3 G: 1; 2 INJECTION, POWDER, FOR SOLUTION INTRAMUSCULAR; INTRAVENOUS at 18:16

## 2022-12-17 RX ADMIN — ACETAMINOPHEN 650 MG: 325 TABLET, FILM COATED ORAL at 18:19

## 2022-12-17 RX ADMIN — METOPROLOL SUCCINATE 25 MG: 25 TABLET, EXTENDED RELEASE ORAL at 04:23

## 2022-12-17 RX ADMIN — OMEPRAZOLE 20 MG: 20 CAPSULE, DELAYED RELEASE ORAL at 04:23

## 2022-12-17 RX ADMIN — AMLODIPINE BESYLATE 10 MG: 10 TABLET ORAL at 04:23

## 2022-12-17 RX ADMIN — TRAMADOL HYDROCHLORIDE 50 MG: 50 TABLET ORAL at 13:37

## 2022-12-17 ASSESSMENT — ENCOUNTER SYMPTOMS
HEADACHES: 0
MYALGIAS: 0
BLURRED VISION: 0
PALPITATIONS: 0
DOUBLE VISION: 0
NAUSEA: 0
WHEEZING: 0
PND: 0
COUGH: 0
VOMITING: 0
BRUISES/BLEEDS EASILY: 0
CHILLS: 0
HEARTBURN: 0
DEPRESSION: 0
CLAUDICATION: 0
HEMOPTYSIS: 0
FEVER: 0
DIZZINESS: 0
BACK PAIN: 0

## 2022-12-17 ASSESSMENT — PAIN DESCRIPTION - PAIN TYPE
TYPE: ACUTE PAIN;SURGICAL PAIN
TYPE: ACUTE PAIN
TYPE: ACUTE PAIN

## 2022-12-17 NOTE — PROGRESS NOTES
4 Eyes Skin Assessment Completed by REAL Woody and REAL Araya.    Head WDL  Ears Red and blanching  Nose WDL  Mouth WDL  Neck WDL  Breast/Chest WDL  Shoulder Blades WDL  Spine WDL  (R) Arm/Elbow/Hand WDL  (L) Arm/Elbow/Hand WDL  Abdomen Incision -Lap sites x4, BARR[place] with dermabond.   Groin WDL  Scrotum/Coccyx/Buttocks WDL  (R) Leg WDL  (L) Leg WDL  (R) Heel/Foot/Toe Boggy, Dry, Flaky  (L) Heel/Foot/Toe Dry, Flaky          Devices In Places Tele Box, Blood Pressure Cuff, Pulse Ox, SCD's, and Nasal Cannula, [place]      Interventions In Place Gray Ear Foams, Pillows, Heels Loaded W/Pillows, and Pressure Redistribution Mattress    Possible Skin Injury No    Pictures Uploaded Into Epic N/A  Wound Consult Placed N/A  RN Wound Prevention Protocol Ordered No

## 2022-12-17 NOTE — PROGRESS NOTES
Monitor Summary:  Rhythm: SR/ST  Rate:   Ectopy: (O) PVC, (R) Trip, 5 BTVT  Measurement:  .18/.06/.31

## 2022-12-17 NOTE — PROGRESS NOTES
Hospital Medicine Daily Progress Note    Date of Service  12/17/2022    Chief Complaint  Katalina Correa is a 82 y.o. female admitted 12/15/2022 with chest pain    Hospital Course  Katalina Correa is a 82 y.o. female with past medical history of CAD, hypertension who [place] [facility] [place].     Patient reportedly had lower chest pain referring to her back and [place] [facility] [place]  [place] abdomen pelvis unremarkable for dissection.  Noted to have elevated troponin trended up to 100, EKG with  nonspecific ST changes.       Patient seen and examined bedside.  Currently asymptomatic.  She does reports of having MI 25 years ago but denies having any stent placed.  Reports she has history of hypertension but not been taking any antihypertensive medication.       Labs noted with worsening leukocytosis.  Ultrasound unremarkable for cholecystitis, CT abdomen with questionable cholecystitis.     Surgeon  Dr byrne consulted for evaluation.     Cardiology  Dr Calderon consulted for NSTEMI    Interval Problem Update  12/16 patient was seen in PACU she just finished cholecystectomy, patient is resting comfortably, she is receiving breathing treatments patient is able to wake up and she tells me that she is feeling okay no chest pain no abdominal pain she follows commands, discussed with PACU staff, continue supportive treatment.  12/17 in bed, c/o abdominal pain surgical site radiated to her shoulder, hb stable although it dropped after sx, no bm no flatus, no fever or chills, low appeatite.     I have discussed this patient's plan of care and discharge plan at IDT rounds today with Case Management, Nursing, Nursing leadership, and other members of the IDT team.    Consultants/Specialty  cardiology and general surgery    Code Status  DNAR/DNI    Disposition  Patient is not medically cleared for discharge.   Anticipate discharge to to home with close outpatient follow-up.  I have  placed the appropriate orders for post-discharge needs.    Review of Systems  Review of Systems   Constitutional:  Negative for chills and fever.   Eyes:  Negative for blurred vision and double vision.   Respiratory:  Negative for cough, hemoptysis and wheezing.    Cardiovascular:  Negative for chest pain, palpitations, claudication, leg swelling and PND.   Gastrointestinal:  Negative for heartburn, nausea and vomiting.   Genitourinary:  Negative for hematuria and urgency.   Musculoskeletal:  Negative for back pain and myalgias.   Skin:  Negative for rash.   Neurological:  Negative for dizziness and headaches.   Endo/Heme/Allergies:  Does not bruise/bleed easily.   Psychiatric/Behavioral:  Negative for depression.       Physical Exam  Temp:  [36.3 °C (97.3 °F)-37.1 °C (98.8 °F)] 37.1 °C (98.8 °F)  Pulse:  [] 97  Resp:  [12-18] 18  BP: (100-120)/(49-69) 111/69  SpO2:  [89 %-96 %] 90 %    Physical Exam  Vitals and nursing note reviewed.   Constitutional:       General: She is not in acute distress.     Appearance: Normal appearance. She is not ill-appearing.   HENT:      Head: Normocephalic.      Mouth/Throat:      Pharynx: Oropharynx is clear.   Eyes:      General: No scleral icterus.     Conjunctiva/sclera: Conjunctivae normal.   Cardiovascular:      Rate and Rhythm: Normal rate and regular rhythm.      Pulses: Normal pulses.      Heart sounds: Normal heart sounds.   Pulmonary:      Effort: Pulmonary effort is normal. No respiratory distress.      Breath sounds: Normal breath sounds. No wheezing.   Abdominal:      General: There is no distension.      Palpations: Abdomen is soft.      Tenderness: There is no guarding.   Musculoskeletal:         General: Normal range of motion.      Cervical back: Normal range of motion and neck supple. No rigidity or tenderness.      Right lower leg: No edema.      Left lower leg: No edema.   Skin:     General: Skin is warm and dry.      Capillary Refill: Capillary refill  takes less than 2 seconds.      Coloration: Skin is not jaundiced.   Neurological:      General: No focal deficit present.      Mental Status: She is alert and oriented to person, place, and time.      Cranial Nerves: No cranial nerve deficit.      Motor: No weakness.   Psychiatric:         Mood and Affect: Mood normal.       Fluids    Intake/Output Summary (Last 24 hours) at 12/17/2022 1243  Last data filed at 12/16/2022 1800  Gross per 24 hour   Intake 240 ml   Output --   Net 240 ml         Laboratory  Recent Labs     12/15/22  0958 12/16/22  0120 12/16/22  1717 12/17/22  0049 12/17/22  0558 12/17/22  1149   WBC 17.0* 22.1*  --  16.3*  --   --    RBC 4.42 4.28  --  3.55*  --   --    HEMOGLOBIN 14.5 14.0   < > 11.4* 10.4* 11.1*   HEMATOCRIT 41.9 40.5   < > 35.3* 31.2* 34.1*   MCV 94.8 94.6  --  99.4*  --   --    MCH 32.8 32.7  --  32.1  --   --    MCHC 34.6 34.6  --  32.3*  --   --    RDW 44.8 44.8  --  47.9  --   --    PLATELETCT 297 299  --  247  --   --    MPV 9.6 9.3  --  9.6  --   --     < > = values in this interval not displayed.       Recent Labs     12/15/22  0958 12/16/22  0120 12/17/22  0049   SODIUM 136 134* 135   POTASSIUM 4.3 3.6 3.8   CHLORIDE 103 102 102   CO2 21 21 24   GLUCOSE 122* 118* 131*   BUN 10 13 16   CREATININE 0.45* 0.46* 0.60   CALCIUM 8.9 8.9 8.8       Recent Labs     12/15/22  0150 12/15/22  1128   APTT 27.1 >240.0*   INR 0.92 1.11                 Imaging  US-FOREIGN FILM ULTRASOUND   Final Result      CT-FOREIGN FILM CAT SCAN   Final Result      OUTSIDE IMAGES-DX CHEST   Final Result      NM-BILIARY (HIDA) SCAN WITH CCK   Final Result      Failure of the gallbladder to fill with radiotracer despite morphine administration. Findings are suspicious for acute cholecystitis.         EC-ECHOCARDIOGRAM COMPLETE W/O CONT   Final Result             Assessment/Plan  * NSTEMI (non-ST elevated myocardial infarction) (HCC)  Assessment & Plan    Telemetry monitoring  Follow serial  troponin/CK-MB/EKG  Transthoracic echocardiograph  O2 2L per nasal cannula to keep saturation more than 92%  Loading with aspirin 325 mg first now if not already given, then aspirin 81 mg daily  On simvastatin   Metoprolol   Nitropatch PRN  NPO    Cardiology consulted  Check lipid panel  CBC/BMP AM  Per cardiology likely type II    Anemia  Assessment & Plan  Blood loss anemia likely due to surgical procedure.   Continue close monitoring  Last hb 11    Advance care planning  Assessment & Plan  I had a prolonged discussion with the patient  regarding goals of care, diagnoses, prognosis, and CODE STATUS. We discussed  her prognosis and comorbidities. I spent 11 minutes on advanced care planning in addition to the time spent managing the other medical problems.      She requested for DNR/DNI    Chest pain, atypical- (present on admission)  Assessment & Plan  The ASCVD Risk score (Tex DK, et al., 2019) failed to calculate for the following reasons:    The 2019 ASCVD risk score is only valid for ages 40 to 79    The patient has a prior MI or stroke diagnosis      Leucocytosis  Assessment & Plan  Stress induced, afebrile, also likely from underlying cholecystitis  No sign of active infection  Remain afebrile  Chest x-ray unremarkable  Check UA   Check procalcitonin      Hypertension  Assessment & Plan  Not on antihypertensive  Start on Norvasc, lisinopril  On IV labetalol as needed  Monitor BP closely      Calculus of gallbladder  Assessment & Plan  Concern cholecystitis given right upper quadrant tenderness, leukocytosis  CT abdomen consistent with cholecystitis  Surgeon  Dr byrne consulted for evaluation  HIDA scan was positive, patient was taken to the OR this morning.  S/p lap isabel 12/16/22    Pain in the chest  Assessment & Plan    -telemetry monitoring  -Follow serial troponin/CK-MB/EKG  -Transthoracic echocardiograph to identify regional wall motion abnormalities and assess LV function  -Stress test  -Aspirin  325 mg  -Heparin drip  -NPO for possible Cath  -Cardiology consulted   -Check lipid panel  -Supplemental oxygen  Resolved likely due to acute cholecystitis               VTE prophylaxis: SCDs/TEDs    I have performed a physical exam and reviewed and updated ROS and Plan today (12/17/2022). In review of yesterday's note (12/16/2022), there are no changes except as documented above.    Continue monitoring hb.

## 2022-12-17 NOTE — PROGRESS NOTES
Assumed care of pt, bedside report received from Annalise NOBLE. Pt A&Ox 4, no complaints of pain at this time. Updated on POC, call light in reach, and fall precautions in place. Bed locked and in lowest position. Pt instructed to call for assistance before getting out of bed. All questions answered, no other needs at this time.

## 2022-12-17 NOTE — CARE PLAN
"The patient is Watcher - Medium risk of patient condition declining or worsening    Shift Goals  Clinical Goals: Monitor vitals & labs; keep NPO for surgery  Patient Goals: \"Feel better\"    Progress made toward(s) clinical / shift goals:    Problem: Knowledge Deficit - Standard  Goal: Patient and family/care givers will demonstrate understanding of plan of care, disease process/condition, diagnostic tests and medications  Outcome: Progressing  Note: White board updated with POC and care team information during bedside report.      Problem: Fall Risk  Goal: Patient will remain free from falls  Outcome: Progressing  Note: Fall precautions in place. Bed in lowest position. Non-skid socks in place. Personal possessions within reach. Mobility sign on door. Bed-alarm on. Call light within reach. Pt educated regarding fall prevention and states understanding.       Problem: Pain - Standard  Goal: Alleviation of pain or a reduction in pain to the patient’s comfort goal  Outcome: Progressing  Note: Pt assessed for pain regularly and medicated PRN per MAR.             "

## 2022-12-17 NOTE — PROGRESS NOTES
Monitor Summary:    SR-ST     Frequent PVC    Nonsustained rate of 181 with mobility    .19/.07/.34

## 2022-12-17 NOTE — PROGRESS NOTES
"  DATE: 12/17/2022    Post Operative Day  1 laparoscopic cholecystectomy.    INTERVAL EVENTS:  Doing well, pain well managed.  Hgb trend downward, monitor.   Hold DVT prophylaxis for now.  Ambulate TID.  Tolerating diet.     PHYSICAL EXAMINATION:  Vital Signs: /62   Pulse (!) 103   Temp 36.5 °C (97.7 °F) (Temporal)   Resp 17   Ht 1.626 m (5' 4\")   Wt 76.7 kg (169 lb 1.5 oz)   SpO2 96%     Abdomen soft, incisional tenderness. Lap sites well approximated with glue..    LABORATORY VALUES:   Recent Labs     12/15/22  0958 12/16/22  0120 12/16/22  1717 12/17/22  0049 12/17/22  0558   WBC 17.0* 22.1*  --  16.3*  --    RBC 4.42 4.28  --  3.55*  --    HEMOGLOBIN 14.5 14.0 11.9* 11.4* 10.4*   HEMATOCRIT 41.9 40.5 35.9* 35.3* 31.2*   MCV 94.8 94.6  --  99.4*  --    MCH 32.8 32.7  --  32.1  --    MCHC 34.6 34.6  --  32.3*  --    RDW 44.8 44.8  --  47.9  --    PLATELETCT 297 299  --  247  --    MPV 9.6 9.3  --  9.6  --      Recent Labs     12/15/22  0958 12/16/22  0120 12/17/22  0049   SODIUM 136 134* 135   POTASSIUM 4.3 3.6 3.8   CHLORIDE 103 102 102   CO2 21 21 24   GLUCOSE 122* 118* 131*   BUN 10 13 16   CREATININE 0.45* 0.46* 0.60   CALCIUM 8.9 8.9 8.8     Recent Labs     12/15/22  0150 12/15/22  0958 12/15/22  1128   ASTSGOT 19 23  --    ALTSGPT 25 17  --    TBILIRUBIN 0.3 0.3  --    IBILIRUBIN 0.1  --   --    DBILIRUBIN <0.2  --   --    ALKPHOSPHAT 100 90  --    GLOBULIN  --  2.8  --    INR 0.92  --  1.11     Recent Labs     12/15/22  0150 12/15/22  1128   APTT 27.1 >240.0*   INR 0.92 1.11        IMAGING:   US-FOREIGN FILM ULTRASOUND   Final Result      CT-FOREIGN FILM CAT SCAN   Final Result      OUTSIDE IMAGES-DX CHEST   Final Result      NM-BILIARY (HIDA) SCAN WITH CCK   Final Result      Failure of the gallbladder to fill with radiotracer despite morphine administration. Findings are suspicious for acute cholecystitis.         EC-ECHOCARDIOGRAM COMPLETE W/O CONT   Final Result          ASSESSMENT AND PLAN: "   Calculus of gallbladder  Assessment & Plan  12/16 HIDA scan suspicious acute cholecystitis.  12/16 Laparoscopic cholecystectomy         ____________________________________     MIGNON Quesada    DD: 12/17/2022  10:40 AM

## 2022-12-18 PROBLEM — D18.03 HEPATIC HEMANGIOMA: Status: ACTIVE | Noted: 2022-12-15

## 2022-12-18 PROBLEM — R07.89 CHEST PAIN, ATYPICAL: Status: RESOLVED | Noted: 2022-12-15 | Resolved: 2022-12-18

## 2022-12-18 PROBLEM — D50.0 ANEMIA DUE TO BLOOD LOSS: Status: ACTIVE | Noted: 2022-12-17

## 2022-12-18 PROBLEM — J96.01 ACUTE RESPIRATORY FAILURE WITH HYPOXIA (HCC): Status: ACTIVE | Noted: 2022-12-18

## 2022-12-18 LAB
ALBUMIN SERPL BCP-MCNC: 3.1 G/DL (ref 3.2–4.9)
ALBUMIN/GLOB SERPL: 1.2 G/DL
ALP SERPL-CCNC: 66 U/L (ref 30–99)
ALT SERPL-CCNC: 37 U/L (ref 2–50)
ANION GAP SERPL CALC-SCNC: 11 MMOL/L (ref 7–16)
AST SERPL-CCNC: 38 U/L (ref 12–45)
BILIRUB SERPL-MCNC: 0.6 MG/DL (ref 0.1–1.5)
BUN SERPL-MCNC: 13 MG/DL (ref 8–22)
CALCIUM ALBUM COR SERPL-MCNC: 9.5 MG/DL (ref 8.5–10.5)
CALCIUM SERPL-MCNC: 8.8 MG/DL (ref 8.5–10.5)
CHLORIDE SERPL-SCNC: 103 MMOL/L (ref 96–112)
CO2 SERPL-SCNC: 25 MMOL/L (ref 20–33)
CREAT SERPL-MCNC: 0.48 MG/DL (ref 0.5–1.4)
ERYTHROCYTE [DISTWIDTH] IN BLOOD BY AUTOMATED COUNT: 46.4 FL (ref 35.9–50)
GFR SERPLBLD CREATININE-BSD FMLA CKD-EPI: 95 ML/MIN/1.73 M 2
GLOBULIN SER CALC-MCNC: 2.6 G/DL (ref 1.9–3.5)
GLUCOSE SERPL-MCNC: 89 MG/DL (ref 65–99)
HCT VFR BLD AUTO: 33.6 % (ref 37–47)
HGB BLD-MCNC: 11.1 G/DL (ref 12–16)
MCH RBC QN AUTO: 32.6 PG (ref 27–33)
MCHC RBC AUTO-ENTMCNC: 33 G/DL (ref 33.6–35)
MCV RBC AUTO: 98.8 FL (ref 81.4–97.8)
PLATELET # BLD AUTO: 277 K/UL (ref 164–446)
PMV BLD AUTO: 9.3 FL (ref 9–12.9)
POTASSIUM SERPL-SCNC: 3.6 MMOL/L (ref 3.6–5.5)
PROT SERPL-MCNC: 5.7 G/DL (ref 6–8.2)
RBC # BLD AUTO: 3.4 M/UL (ref 4.2–5.4)
SODIUM SERPL-SCNC: 139 MMOL/L (ref 135–145)
WBC # BLD AUTO: 15.7 K/UL (ref 4.8–10.8)

## 2022-12-18 PROCEDURE — 770001 HCHG ROOM/CARE - MED/SURG/GYN PRIV*

## 2022-12-18 PROCEDURE — 80053 COMPREHEN METABOLIC PANEL: CPT

## 2022-12-18 PROCEDURE — 700101 HCHG RX REV CODE 250: Performed by: HOSPITALIST

## 2022-12-18 PROCEDURE — 36415 COLL VENOUS BLD VENIPUNCTURE: CPT

## 2022-12-18 PROCEDURE — 700102 HCHG RX REV CODE 250 W/ 637 OVERRIDE(OP): Performed by: NURSE PRACTITIONER

## 2022-12-18 PROCEDURE — A9270 NON-COVERED ITEM OR SERVICE: HCPCS | Performed by: NURSE PRACTITIONER

## 2022-12-18 PROCEDURE — 700102 HCHG RX REV CODE 250 W/ 637 OVERRIDE(OP): Performed by: STUDENT IN AN ORGANIZED HEALTH CARE EDUCATION/TRAINING PROGRAM

## 2022-12-18 PROCEDURE — 99232 SBSQ HOSP IP/OBS MODERATE 35: CPT | Performed by: NURSE PRACTITIONER

## 2022-12-18 PROCEDURE — 700111 HCHG RX REV CODE 636 W/ 250 OVERRIDE (IP): Performed by: STUDENT IN AN ORGANIZED HEALTH CARE EDUCATION/TRAINING PROGRAM

## 2022-12-18 PROCEDURE — A9270 NON-COVERED ITEM OR SERVICE: HCPCS | Performed by: STUDENT IN AN ORGANIZED HEALTH CARE EDUCATION/TRAINING PROGRAM

## 2022-12-18 PROCEDURE — 99024 POSTOP FOLLOW-UP VISIT: CPT | Performed by: SURGERY

## 2022-12-18 PROCEDURE — 700105 HCHG RX REV CODE 258: Performed by: STUDENT IN AN ORGANIZED HEALTH CARE EDUCATION/TRAINING PROGRAM

## 2022-12-18 PROCEDURE — 97162 PT EVAL MOD COMPLEX 30 MIN: CPT

## 2022-12-18 PROCEDURE — 85027 COMPLETE CBC AUTOMATED: CPT

## 2022-12-18 RX ORDER — OXYCODONE HYDROCHLORIDE 5 MG/1
5 TABLET ORAL EVERY 4 HOURS PRN
Status: DISCONTINUED | OUTPATIENT
Start: 2022-12-18 | End: 2022-12-19 | Stop reason: HOSPADM

## 2022-12-18 RX ORDER — OXYCODONE HYDROCHLORIDE 10 MG/1
10 TABLET ORAL EVERY 4 HOURS PRN
Status: DISCONTINUED | OUTPATIENT
Start: 2022-12-18 | End: 2022-12-19 | Stop reason: HOSPADM

## 2022-12-18 RX ADMIN — AMPICILLIN AND SULBACTAM 3 G: 1; 2 INJECTION, POWDER, FOR SOLUTION INTRAMUSCULAR; INTRAVENOUS at 05:01

## 2022-12-18 RX ADMIN — OXYCODONE HYDROCHLORIDE 10 MG: 10 TABLET ORAL at 09:47

## 2022-12-18 RX ADMIN — ACETAMINOPHEN 650 MG: 325 TABLET, FILM COATED ORAL at 05:03

## 2022-12-18 RX ADMIN — OMEPRAZOLE 20 MG: 20 CAPSULE, DELAYED RELEASE ORAL at 05:02

## 2022-12-18 RX ADMIN — SENNOSIDES AND DOCUSATE SODIUM 2 TABLET: 50; 8.6 TABLET ORAL at 16:42

## 2022-12-18 RX ADMIN — SIMVASTATIN 20 MG: 20 TABLET, FILM COATED ORAL at 20:29

## 2022-12-18 RX ADMIN — LIDOCAINE 1 PATCH: 700 PATCH TOPICAL at 09:20

## 2022-12-18 RX ADMIN — AMPICILLIN AND SULBACTAM 3 G: 1; 2 INJECTION, POWDER, FOR SOLUTION INTRAMUSCULAR; INTRAVENOUS at 12:20

## 2022-12-18 RX ADMIN — OXYCODONE HYDROCHLORIDE 10 MG: 10 TABLET ORAL at 16:43

## 2022-12-18 ASSESSMENT — ENCOUNTER SYMPTOMS
SHORTNESS OF BREATH: 0
FEVER: 0
HEADACHES: 0
CHILLS: 0
COUGH: 0
PALPITATIONS: 0
WEAKNESS: 0
DIZZINESS: 0
DIARRHEA: 0
VOMITING: 0
NAUSEA: 0

## 2022-12-18 ASSESSMENT — COGNITIVE AND FUNCTIONAL STATUS - GENERAL
MOVING FROM LYING ON BACK TO SITTING ON SIDE OF FLAT BED: A LITTLE
TURNING FROM BACK TO SIDE WHILE IN FLAT BAD: A LITTLE
WALKING IN HOSPITAL ROOM: A LITTLE
CLIMB 3 TO 5 STEPS WITH RAILING: A LITTLE
MOVING TO AND FROM BED TO CHAIR: A LITTLE
SUGGESTED CMS G CODE MODIFIER MOBILITY: CK
STANDING UP FROM CHAIR USING ARMS: A LITTLE
MOBILITY SCORE: 18

## 2022-12-18 ASSESSMENT — PAIN DESCRIPTION - PAIN TYPE
TYPE: ACUTE PAIN

## 2022-12-18 ASSESSMENT — GAIT ASSESSMENTS
ASSISTIVE DEVICE: FRONT WHEEL WALKER
GAIT LEVEL OF ASSIST: CONTACT GUARD ASSIST
DISTANCE (FEET): 200

## 2022-12-18 NOTE — CARE PLAN
The patient is Watcher - Medium risk of patient condition declining or worsening    Shift Goals  Clinical Goals: wean off oxygen  Patient Goals: rest  Family Goals: safe d/c    Progress made toward(s) clinical / shift goals:    Problem: Knowledge Deficit - Standard  Goal: Patient and family/care givers will demonstrate understanding of plan of care, disease process/condition, diagnostic tests and medications  Outcome: Progressing

## 2022-12-18 NOTE — CARE PLAN
The patient is Stable - Low risk of patient condition declining or worsening    Shift Goals  Clinical Goals: wean off O2  Patient Goals: rest  Family Goals: safe d/c    Progress made toward(s) clinical / shift goals:  Patient making progress toward shift goals. Alert and oriented x 4. Vital signs stable.

## 2022-12-18 NOTE — PROGRESS NOTES
Hospital Medicine Daily Progress Note    Date of Service  12/18/2022    Chief Complaint  Katalina Correa is a 82 y.o. female admitted 12/15/2022 with NSTEMI.    Hospital Course  Katalina Correa is a 82 y.o. female with past medical history of CAD and hypertension who was transferred on 12/15/2022 from [facility] [place] for [place]STEMI and cardiology evaluation.  CTA negative for dissection.  Cardiology consulted.  Per Cardiology, elevated troponin is Type 2 and not related to ACS; therefore, heparin drip discontinued.  Abdominal US showed cholecystitis.  HIDA positive.  Surgery consulted.  S/p lap isabel on 12/16.  Continues to display acute respiratory failure with hypoxia.      Interval Problem Update  -Continues to endorse mild discomfort at lap insertion sites.    -Asking when she may be discharged.  -Encouraged IS use.    I have discussed this patient's plan of care and discharge plan at IDT rounds today with Case Management, Nursing, Nursing leadership, and other members of the IDT team.    Consultants/Specialty  cardiology and general surgery    Code Status  DNAR/DNI    Disposition  Patient is not medically cleared for discharge.   Anticipate discharge to to home with close outpatient follow-up.  I have placed the appropriate orders for post-discharge needs.    Review of Systems  Review of Systems   Constitutional:  Negative for chills and fever.   HENT:  Negative for ear discharge.    Respiratory:  Negative for cough and shortness of breath.    Cardiovascular:  Negative for chest pain, palpitations and leg swelling.   Gastrointestinal:  Negative for diarrhea, nausea and vomiting.   Genitourinary:  Negative for dysuria.   Skin:  Negative for rash.   Neurological:  Negative for dizziness, weakness and headaches.      Physical Exam  Temp:  [36 °C (96.8 °F)-37.1 °C (98.8 °F)] 36.1 °C (97 °F)  Pulse:  [76-97] 76  Resp:  [16-18] 16  BP: ()/(56-82) 124/66  SpO2:  [90 %-91 %] 91 %    Physical Exam  Vitals and nursing note reviewed.    Constitutional:       General: She is not in acute distress.     Appearance: Normal appearance.   HENT:      Head: Normocephalic and atraumatic.      Right Ear: External ear normal.      Left Ear: External ear normal.      Nose: Nose normal.      Mouth/Throat:      Mouth: Mucous membranes are moist.      Pharynx: Oropharynx is clear.   Eyes:      General: No scleral icterus.     Conjunctiva/sclera: Conjunctivae normal.   Cardiovascular:      Rate and Rhythm: Normal rate and regular rhythm.      Pulses: Normal pulses.   Pulmonary:      Effort: Pulmonary effort is normal. No respiratory distress.      Breath sounds: Normal breath sounds.   Abdominal:      General: Bowel sounds are normal.      Palpations: Abdomen is soft.      Tenderness: There is no guarding or rebound.      Comments: Mild discomfort at lap insertion sites.  Sites well approximated with glue.  No evidence of drainage or erythema.     Neurological:      Mental Status: She is alert.     Fluids    Intake/Output Summary (Last 24 hours) at 12/18/2022 1058  Last data filed at 12/17/2022 2153  Gross per 24 hour   Intake 360 ml   Output 700 ml   Net -340 ml     Laboratory  Recent Labs     12/16/22  0120 12/16/22  1717 12/17/22  0049 12/17/22  0558 12/17/22  1149 12/18/22  0831   WBC 22.1*  --  16.3*  --   --  15.7*   RBC 4.28  --  3.55*  --   --  3.40*   HEMOGLOBIN 14.0   < > 11.4* 10.4* 11.1* 11.1*   HEMATOCRIT 40.5   < > 35.3* 31.2* 34.1* 33.6*   MCV 94.6  --  99.4*  --   --  98.8*   MCH 32.7  --  32.1  --   --  32.6   MCHC 34.6  --  32.3*  --   --  33.0*   RDW 44.8  --  47.9  --   --  46.4   PLATELETCT 299  --  247  --   --  277   MPV 9.3  --  9.6  --   --  9.3    < > = values in this interval not displayed.     Recent Labs     12/16/22  0120 12/17/22  0049 12/18/22  0501   SODIUM 134* 135 139   POTASSIUM 3.6 3.8 3.6   CHLORIDE 102 102 103   CO2 21 24 25   GLUCOSE 118* 131* 89   BUN 13 16 13   CREATININE 0.46* 0.60 0.48*   CALCIUM 8.9 8.8 8.8     Recent  Labs     12/15/22  1128   APTT >240.0*   INR 1.11     Imaging  US-FOREIGN FILM ULTRASOUND   Final Result      CT-FOREIGN FILM CAT SCAN   Final Result      OUTSIDE IMAGES-DX CHEST   Final Result      NM-BILIARY (HIDA) SCAN WITH CCK   Final Result      Failure of the gallbladder to fill with radiotracer despite morphine administration. Findings are suspicious for acute cholecystitis.         EC-ECHOCARDIOGRAM COMPLETE W/O CONT   Final Result         Assessment/Plan  * NSTEMI (non-ST elevated myocardial infarction) (HCC)  Assessment & Plan  -ECHO showed normal left ventricular systolic function, EF estimated >60%, accurate wall motion abnormality cannot be determined, difficult to assess LV size and thickness with updated images, and accurate wall motion abnormality cannot be determined.    -Cardiology consulted.  Per Cardiology, elevated troponin is Type 2 and not related to ACS; therefore, heparin drip discontinued.   -Pending lipid panel and hemoglobin A1c.  -Continue metoprolol and simvastatin.  -Recommended establishing care with outpatient cardiology.      Acute respiratory failure with hypoxia (HCC)  Assessment & Plan  -CXR unremarkable.    -COVID, RSV, and influenza A/B negative.  -Encourage IS.  -Wean oxygen as able.        Anemia due to blood loss  Assessment & Plan  -Decreased, but stable.    -No evidence of active bleeding.  -Monitor.      Advance care planning  Assessment & Plan  -Prior Hospitalist discussed code status.  -Patient requested DNR/DNI.      Leucocytosis  Assessment & Plan  -Likely stress induced.    -No evidence of active infection.  -Afebrile.    -CXR unremarkable.    -COVID, RSV, and influenza A/B negative.  -Procalcitonin negative.  -UA pending.      Hypertension  Assessment & Plan  -Stable.  -Not on antihypertensives OP.  -Continue Norvasc, lisinopril, and PRN IV labetalol.       Calculus of gallbladder  Assessment & Plan  -Abdominal US showed cholecystitis.    -HIDA positive.    -Surgery  consulted.    -S/p lap isabel on 12/16.           Pain in the chest  Assessment & Plan  -Resolved.  -Likely secondary to cholecystitis.        VTE prophylaxis: pharmacologic prophylaxis contraindicated due to recent surgical procedure.    I have performed a physical exam and reviewed and updated ROS and Plan today (12/18/2022). In review of yesterday's note (12/17/2022), there are no changes except as documented above.

## 2022-12-18 NOTE — PROGRESS NOTES
Writer assumed care of patient at 1600 - Vital signs stable, patient on 3 L oxygen via nasal cannula. Patient refusing to be admitted to room 601-1 because there was no television, patient requesting to wait for another room to become available.     Patient then transferred to room 602-1 when it became available.

## 2022-12-18 NOTE — THERAPY
"Physical Therapy   Initial Evaluation     Patient Name: Katalina Correa  Age:  82 y.o., Sex:  female  Medical Record #: [id number]  Today's Date: 12/18/2022     Precautions  Precautions: Fall Risk (abdominal precautions)    Assessment  PT eval order received, chart reviewed, RN Cleared pt for PT evaluation. Pt is an 83 y/o F with c/o CP, [place] [facility] [place] and cardio eval/RUQ abdominal pain. Dx:  calculus of gallblader, HIDA scan revealed acute cholecystitis and pt underwent s/p Laparoscopic cholecystectomy on 12/16 by Dr. Conteh.  As per cardio:  \"positive troponin in the setting of likely cholecystitis.  Her HIDA scan is now back which is positive.  Her echocardiogram is ordered which is now back showing no focal wall motion abnormalities.  I think her troponin is a type II and not related to ACS\".  Also ex NSTEMI/CP.  PMH includes but is not limited to: CAD, HTN. Please refer to chart for full details    PT eval completed. Pt educated on abdominal precautions and all were maintained during PT evaluation today. Pt demo good prognosis for therapy and will benefit from continued skilled PT services to improve functional mobility and to maximize functional level of independence. See grid above for details on evaluation.  Pt has good support at home from her . I encouraged her to ambulate with nursing staff in hallways TID if able.  Pt is pleasant and cooperative. She will require a FWW for now in order to improve her energy conservation and stability during mrADL's and mobility. Upon completion pt was left seated UIC, chair alarm On and activated, CLIR, personal needs met, handoff to Rn completed.    Plan    Recommend Physical Therapy 3 times per week until therapy goals are met for the following treatments:  Bed Mobility, Equipment, Gait Training, Neuro Re-Education / Balance, Stair Training, Therapeutic Activities, and Therapeutic Exercises    DC Equipment Recommendations: Front-Wheel Walker  Discharge " Recommendations: Other - (DC rec: home with family and HHPT and FWW. RN notified)        Objective       12/18/22 1017   Charge Group   PT Evaluation PT Evaluation Mod   Total Time Spent   PT Total Time Yes   Initial Contact Note    Initial Contact Note Order Received and Verified, Physical Therapy Evaluation in Progress with Full Report to Follow.   Precautions   Precautions Fall Risk  (abdominal precautions)   Vitals   Pulse 89   Blood Pressure  130/56   Pulse Oximetry 90 %   O2 (LPM) 3   O2 Delivery Device Nasal Cannula   Vitals Comments taken from nursing notes prior to session   Pain 0 - 10 Group   Therapist Pain Assessment 8;Prior to Activity;Nurse Notified  (c/o 8.5/10 pain in her abdomen, RN administering medications during session)   Prior Living Situation   Prior Services Home-Independent   Housing / Facility 2 Story House   Steps Into Home 1   Equipment Owned None   Lives with - Patient's Self Care Capacity Spouse   Comments lives in a 2SH with her  in [place],  is retired and can assist. pt is retired as well. Reports all needs on 1st floor including the master bed/bath and kitchen. There is a small step to enter through the garage.   Prior Level of Functional Mobility   Bed Mobility Independent   Transfer Status Independent   Ambulation Independent   Distance Ambulation (Feet)   (community level distances)   Stairs Independent   Comments PLOF independent no DME   History of Falls   History of Falls No   Cognition    Cognition / Consciousness WDL   Level of Consciousness Alert   Passive ROM Lower Body   Passive ROM Lower Body WDL   Active ROM Lower Body    Active ROM Lower Body  WDL   Strength Lower Body   Lower Body Strength  WDL   Sensation Lower Body   Lower Extremity Sensation   WDL   Lower Body Muscle Tone   Lower Body Muscle Tone  WDL   Strength Upper Body   Upper Body Strength  WDL   Upper Body Muscle Tone   Upper Body Muscle Tone  WDL   Balance Assessment   Sitting Balance  "(Static) Good   Sitting Balance (Dynamic) Good   Standing Balance (Static) Fair +   Standing Balance (Dynamic) Fair +   Weight Shift Sitting Good   Weight Shift Standing Fair   Comments standing with FWW   Gait Analysis   Gait Level Of Assist Contact Guard Assist   Assistive Device Front Wheel Walker   Distance (Feet) 200   # of Times Distance was Traveled 1   Comments Pt ambulated ~ 200' with FWW requiring initially CGA for balance but improved to a supv level for safety. She demo a slow pace, cues for posture as pt has tendency to lean forward. no buckling noted but pt will benefit from a FWW for stability and safety upon DC home. She  declined to practice the step during PT evaluation, reports her  will help her at home   Bed Mobility    Supine to Sit Moderate Assist   Rolling Minimum Assist to Lt.   Comments mod A for trunk upright, encouraged log roll technique. Pt reports she can sleep in the recliner at home or her  will help her out of bed   Functional Mobility   Sit to Stand Supervised   Bed, Chair, Wheelchair Transfer Supervised   Comments supv for safety with FWW   How much difficulty does the patient currently have...   Turning over in bed (including adjusting bedclothes, sheets and blankets)? 3   Sitting down on and standing up from a chair with arms (e.g., wheelchair, bedside commode, etc.) 3   Moving from lying on back to sitting on the side of the bed? 3   How much help from another person does the patient currently need...   Moving to and from a bed to a chair (including a wheelchair)? 3   Need to walk in a hospital room? 3   Climbing 3-5 steps with a railing? 3   6 clicks Mobility Score 18   Activity Tolerance   Sitting in Chair left UIC upon completoin   Standing tanding with FWW   Patient / Family Goals    Patient / Family Goal #1 \" to be able to go home\"   Short Term Goals    Short Term Goal # 1 Pt will complete all aspects of bed mobility with supv within 6 visits in order to " improve bed mobility   Short Term Goal # 2 Pt will ambulate to distances of 150' or greater with LRAD with supv within 6 visits in order to ambulate community level distances   Short Term Goal # 3 Pt will negotiate 1 step with LRAD and supv within 6 visits in order to negotiate step to enter her home   Education Group   Education Provided Role of Physical Therapist   Role of Physical Therapist Patient Response Patient;Acceptance;Explanation;Demonstration;Verbal Demonstration   Additional Comments abdominal precautions   Problem List    Problems Pain;Impaired Bed Mobility;Impaired Ambulation;Functional Strength Deficit;Impaired Balance;Decreased Activity Tolerance;Safety Awareness Deficits / Cognition   Anticipated Discharge Equipment and Recommendations   DC Equipment Recommendations Front-Wheel Walker   Discharge Recommendations Other -  (DC rec: home with family and HHPT and FWW. RN notified)   Interdisciplinary Plan of Care Collaboration   IDT Collaboration with  Nursing   Patient Position at End of Therapy Seated;Chair Alarm On;Call Light within Reach;Tray Table within Reach;Phone within Reach   Collaboration Comments handoff to RN completed   Session Information   Date / Session Number  12/18-1(1/3, 12/24)

## 2022-12-18 NOTE — PROGRESS NOTES
"  DATE: 12/18/2022    Post Operative Day  2  laparoscopic cholecystectomy .    INTERVAL EVENTS:  Tolerating diet, passing flatus but no stool yet. Pain controlled on current medications. Remains afebrile and hemodynamically stable. Hemoglobin stable. Continues to require supplemental oxygen - likely multifactorial: severe inflammation of the gallbladder, perioperative fluids, postoperative pain with respiration.    PHYSICAL EXAMINATION:  Vital Signs: /56   Pulse 89   Temp 36.1 °C (97 °F) (Temporal)   Resp 16   Ht 1.626 m (5' 4\")   Wt 76.7 kg (169 lb 1.5 oz)   SpO2 90%     Gen: No apparent distress, alert and oriented x3  HEENT: Nasal cannula in place, sclera anicteric  CV: Regular rate and rhythm  Abd: Port-site incisions well-approximated with Dermabond in place, soft, appropriately tender to palpation    LABORATORY VALUES:   Recent Labs     12/16/22  0120 12/16/22  1717 12/17/22  0049 12/17/22  0558 12/17/22  1149 12/18/22  0831   WBC 22.1*  --  16.3*  --   --  15.7*   RBC 4.28  --  3.55*  --   --  3.40*   HEMOGLOBIN 14.0   < > 11.4* 10.4* 11.1* 11.1*   HEMATOCRIT 40.5   < > 35.3* 31.2* 34.1* 33.6*   MCV 94.6  --  99.4*  --   --  98.8*   MCH 32.7  --  32.1  --   --  32.6   MCHC 34.6  --  32.3*  --   --  33.0*   RDW 44.8  --  47.9  --   --  46.4   PLATELETCT 299  --  247  --   --  277   MPV 9.3  --  9.6  --   --  9.3    < > = values in this interval not displayed.     Recent Labs     12/16/22  0120 12/17/22  0049 12/18/22  0501   SODIUM 134* 135 139   POTASSIUM 3.6 3.8 3.6   CHLORIDE 102 102 103   CO2 21 24 25   GLUCOSE 118* 131* 89   BUN 13 16 13   CREATININE 0.46* 0.60 0.48*   CALCIUM 8.9 8.8 8.8     Recent Labs     12/18/22  0501   ASTSGOT 38   ALTSGPT 37   TBILIRUBIN 0.6   ALKPHOSPHAT 66   GLOBULIN 2.6            IMAGING:   US-FOREIGN FILM ULTRASOUND   Final Result      CT-FOREIGN FILM CAT SCAN   Final Result      OUTSIDE IMAGES-DX CHEST   Final Result      NM-BILIARY (HIDA) SCAN WITH CCK   Final " Result      Failure of the gallbladder to fill with radiotracer despite morphine administration. Findings are suspicious for acute cholecystitis.         EC-ECHOCARDIOGRAM COMPLETE W/O CONT   Final Result          ASSESSMENT AND PLAN:   Hepatic hemangioma- (present on admission)  Assessment & Plan  6.1 cm in diameter mass in the right lobe of the liver most consistent with a hemangioma noted on CT abdomen/pelvis on 12/15/22.  Previously 4.5 cm on CTA on 2/20/17.  Recommend follow-up CT triple-phase of the liver, can be accomplished as an outpatient.    Calculus of gallbladder  Assessment & Plan  12/16 Laparoscopic cholecystectomy  Meeting all postoperative milestones - tolerating diet, passing flatus, pain controlled.  Leukocytosis improving, recommend no more than 4-day course of antibiotics post-operatively.  Hemoglobin stable, can begin DVT prophylaxis this evening.  Can be discharged when medically clear, may require supplemental oxygen.         ____________________________________     Roni Conteh M.D.    DD: 12/18/2022  2:30 PM

## 2022-12-18 NOTE — ASSESSMENT & PLAN NOTE
-Abdominal US showed cholecystitis.    -HIDA positive.    -Surgery consulted.    -S/p lap isabel on 12/16.         
-CXR unremarkable.    -COVID, RSV, and influenza A/B negative.  -Encourage IS.  -Wean oxygen as able.      
-Decreased, but stable.    -No evidence of active bleeding.  -Monitor.    
-ECHO showed normal left ventricular systolic function, EF estimated >60%, accurate wall motion abnormality cannot be determined, difficult to assess LV size and thickness with updated images, and accurate wall motion abnormality cannot be determined.    -Cardiology consulted.  Per Cardiology, elevated troponin is Type 2 and not related to ACS; therefore, heparin drip discontinued.   -Pending lipid panel and hemoglobin A1c.  -Continue metoprolol and simvastatin.  -Recommended establishing care with outpatient cardiology.    
-Likely stress induced.    -No evidence of active infection.  -Afebrile.    -CXR unremarkable.    -COVID, RSV, and influenza A/B negative.  -Procalcitonin negative.  -UA pending.    
-Prior Hospitalist discussed code status.  -Patient requested DNR/DNI.    
-Resolved.  -Likely secondary to cholecystitis.        
-Stable.  -Not on antihypertensives OP.  -Continue Norvasc, lisinopril, and PRN IV labetalol.     
12/16 Laparoscopic cholecystectomy  Meeting all postoperative milestones - tolerating diet, passing flatus, pain controlled.  Leukocytosis improving, recommend no more than 4-day course of antibiotics post-operatively.  Hemoglobin stable, can begin DVT prophylaxis this evening.  Can be discharged when medically clear, may require supplemental oxygen.  
6.1 cm in diameter mass in the right lobe of the liver most consistent with a hemangioma noted on CT abdomen/pelvis on 12/15/22.  Previously 4.5 cm on CTA on 2/20/17.  Recommend follow-up CT triple-phase of the liver, can be accomplished as an outpatient.  
The ASCVD Risk score (Tex GONZALES, et al., 2019) failed to calculate for the following reasons:    The 2019 ASCVD risk score is only valid for ages 40 to 79    The patient has a prior MI or stroke diagnosis    
Detail Level: Zone
Plan: Briefly discussed consider Spironolactone if persists or worsens
Initiate Treatment: Epiduo forte every other night to nightly as tolerated
Samples Given: Epiduo forte

## 2022-12-19 VITALS
HEART RATE: 86 BPM | HEIGHT: 64 IN | SYSTOLIC BLOOD PRESSURE: 119 MMHG | WEIGHT: 169.09 LBS | TEMPERATURE: 96.9 F | RESPIRATION RATE: 16 BRPM | OXYGEN SATURATION: 92 % | DIASTOLIC BLOOD PRESSURE: 62 MMHG | BODY MASS INDEX: 28.87 KG/M2

## 2022-12-19 PROBLEM — R07.9 PAIN IN THE CHEST: Status: RESOLVED | Noted: 2022-12-15 | Resolved: 2022-12-19

## 2022-12-19 PROBLEM — I10 HYPERTENSION: Status: RESOLVED | Noted: 2022-12-15 | Resolved: 2022-12-19

## 2022-12-19 LAB
ANION GAP SERPL CALC-SCNC: 16 MMOL/L (ref 7–16)
BASOPHILS # BLD AUTO: 0.5 % (ref 0–1.8)
BASOPHILS # BLD: 0.07 K/UL (ref 0–0.12)
BUN SERPL-MCNC: 10 MG/DL (ref 8–22)
CALCIUM SERPL-MCNC: 9 MG/DL (ref 8.5–10.5)
CHLORIDE SERPL-SCNC: 101 MMOL/L (ref 96–112)
CHOLEST SERPL-MCNC: 164 MG/DL (ref 100–199)
CO2 SERPL-SCNC: 24 MMOL/L (ref 20–33)
CREAT SERPL-MCNC: 0.43 MG/DL (ref 0.5–1.4)
EOSINOPHIL # BLD AUTO: 0.2 K/UL (ref 0–0.51)
EOSINOPHIL NFR BLD: 1.5 % (ref 0–6.9)
ERYTHROCYTE [DISTWIDTH] IN BLOOD BY AUTOMATED COUNT: 45.5 FL (ref 35.9–50)
EST. AVERAGE GLUCOSE BLD GHB EST-MCNC: 97 MG/DL
GFR SERPLBLD CREATININE-BSD FMLA CKD-EPI: 98 ML/MIN/1.73 M 2
GLUCOSE SERPL-MCNC: 79 MG/DL (ref 65–99)
HBA1C MFR BLD: 5 % (ref 4–5.6)
HCT VFR BLD AUTO: 35.2 % (ref 37–47)
HDLC SERPL-MCNC: 40 MG/DL
HGB BLD-MCNC: 11.6 G/DL (ref 12–16)
IMM GRANULOCYTES # BLD AUTO: 0.16 K/UL (ref 0–0.11)
IMM GRANULOCYTES NFR BLD AUTO: 1.2 % (ref 0–0.9)
LDLC SERPL CALC-MCNC: 99 MG/DL
LYMPHOCYTES # BLD AUTO: 2.56 K/UL (ref 1–4.8)
LYMPHOCYTES NFR BLD: 19.6 % (ref 22–41)
MCH RBC QN AUTO: 32 PG (ref 27–33)
MCHC RBC AUTO-ENTMCNC: 33 G/DL (ref 33.6–35)
MCV RBC AUTO: 97.2 FL (ref 81.4–97.8)
MONOCYTES # BLD AUTO: 1.07 K/UL (ref 0–0.85)
MONOCYTES NFR BLD AUTO: 8.2 % (ref 0–13.4)
NEUTROPHILS # BLD AUTO: 9.03 K/UL (ref 2–7.15)
NEUTROPHILS NFR BLD: 69 % (ref 44–72)
NRBC # BLD AUTO: 0 K/UL
NRBC BLD-RTO: 0 /100 WBC
PLATELET # BLD AUTO: 321 K/UL (ref 164–446)
PMV BLD AUTO: 9.6 FL (ref 9–12.9)
POTASSIUM SERPL-SCNC: 3.6 MMOL/L (ref 3.6–5.5)
RBC # BLD AUTO: 3.62 M/UL (ref 4.2–5.4)
SODIUM SERPL-SCNC: 141 MMOL/L (ref 135–145)
TRIGL SERPL-MCNC: 127 MG/DL (ref 0–149)
WBC # BLD AUTO: 13.1 K/UL (ref 4.8–10.8)

## 2022-12-19 PROCEDURE — 700102 HCHG RX REV CODE 250 W/ 637 OVERRIDE(OP): Performed by: STUDENT IN AN ORGANIZED HEALTH CARE EDUCATION/TRAINING PROGRAM

## 2022-12-19 PROCEDURE — 36415 COLL VENOUS BLD VENIPUNCTURE: CPT

## 2022-12-19 PROCEDURE — 700101 HCHG RX REV CODE 250: Performed by: HOSPITALIST

## 2022-12-19 PROCEDURE — 85025 COMPLETE CBC W/AUTO DIFF WBC: CPT

## 2022-12-19 PROCEDURE — A9270 NON-COVERED ITEM OR SERVICE: HCPCS | Performed by: STUDENT IN AN ORGANIZED HEALTH CARE EDUCATION/TRAINING PROGRAM

## 2022-12-19 PROCEDURE — 80061 LIPID PANEL: CPT

## 2022-12-19 PROCEDURE — 700102 HCHG RX REV CODE 250 W/ 637 OVERRIDE(OP): Performed by: NURSE PRACTITIONER

## 2022-12-19 PROCEDURE — A9270 NON-COVERED ITEM OR SERVICE: HCPCS | Performed by: NURSE PRACTITIONER

## 2022-12-19 PROCEDURE — 99239 HOSP IP/OBS DSCHRG MGMT >30: CPT | Performed by: NURSE PRACTITIONER

## 2022-12-19 PROCEDURE — 80048 BASIC METABOLIC PNL TOTAL CA: CPT

## 2022-12-19 PROCEDURE — 83036 HEMOGLOBIN GLYCOSYLATED A1C: CPT

## 2022-12-19 RX ORDER — LISINOPRIL 5 MG/1
10 TABLET ORAL DAILY
Qty: 60 TABLET | Refills: 0 | Status: SHIPPED | OUTPATIENT
Start: 2022-12-20 | End: 2022-12-19

## 2022-12-19 RX ORDER — SIMVASTATIN 20 MG
20 TABLET ORAL NIGHTLY
Qty: 30 TABLET | Refills: 0 | Status: SHIPPED | OUTPATIENT
Start: 2022-12-19

## 2022-12-19 RX ORDER — METOPROLOL SUCCINATE 25 MG/1
25 TABLET, EXTENDED RELEASE ORAL DAILY
Qty: 30 TABLET | Refills: 0 | Status: SHIPPED | OUTPATIENT
Start: 2022-12-19

## 2022-12-19 RX ORDER — OXYCODONE HYDROCHLORIDE 5 MG/1
5 TABLET ORAL EVERY 4 HOURS PRN
Qty: 18 TABLET | Refills: 0 | Status: SHIPPED | OUTPATIENT
Start: 2022-12-19 | End: 2022-12-22

## 2022-12-19 RX ADMIN — METOPROLOL SUCCINATE 25 MG: 25 TABLET, EXTENDED RELEASE ORAL at 05:19

## 2022-12-19 RX ADMIN — OXYCODONE HYDROCHLORIDE 10 MG: 10 TABLET ORAL at 13:32

## 2022-12-19 RX ADMIN — OXYCODONE HYDROCHLORIDE 10 MG: 10 TABLET ORAL at 05:24

## 2022-12-19 RX ADMIN — LIDOCAINE 1 PATCH: 700 PATCH TOPICAL at 09:32

## 2022-12-19 RX ADMIN — OMEPRAZOLE 20 MG: 20 CAPSULE, DELAYED RELEASE ORAL at 05:19

## 2022-12-19 RX ADMIN — AMLODIPINE BESYLATE 10 MG: 10 TABLET ORAL at 05:19

## 2022-12-19 RX ADMIN — LISINOPRIL 5 MG: 10 TABLET ORAL at 05:19

## 2022-12-19 RX ADMIN — SENNOSIDES AND DOCUSATE SODIUM 2 TABLET: 50; 8.6 TABLET ORAL at 05:19

## 2022-12-19 ASSESSMENT — ENCOUNTER SYMPTOMS
FEVER: 0
DIARRHEA: 0
DIZZINESS: 0
INSOMNIA: 0
CHILLS: 0
WEAKNESS: 0
NAUSEA: 0
HEADACHES: 0
MYALGIAS: 0
EYE PAIN: 0
PALPITATIONS: 0
DEPRESSION: 0
COUGH: 0
VOMITING: 0
SHORTNESS OF BREATH: 0
WHEEZING: 0
SORE THROAT: 0

## 2022-12-19 ASSESSMENT — PAIN DESCRIPTION - PAIN TYPE: TYPE: ACUTE PAIN

## 2022-12-19 NOTE — DISCHARGE SUMMARY
Discharge Summary    CHIEF COMPLAINT ON ADMISSION  Chief Complaint   Patient presents with    Sent by MD     Transfer from [facility] [place] for [place]. Patient started to have indigestion last night at 630p and progressively got worse. Initial trop 106, then 60. Patient on nitro and heparin gtt on arrival     Reason for Admission  NSTEMI and cholecystitis    Admission Date  12/15/2022    CODE STATUS  DNAR/DNI    HPI & HOSPITAL COURSE  Katalina Correa is a 82 y.o. female admitted 12/15/2022 with NSTEMI.  Kaatlina Correa is a 82 y.o. female with past medical history of CAD and hypertension who was transferred on 12/15/2022 from [facility] for NSTEMI and cardiology evaluation.  CTA negative for dissection.  Cardiology consulted.  Per Cardiology, elevated troponin is Type 2 and not related to ACS; therefore, heparin drip discontinued.  Abdominal US showed cholecystitis.  HIDA positive.  Surgery consulted.  S/p lap isabel on 12/16.  Okay for discharge from surgery standpoint.  Surgeon, Dr Conteh, recommended OP CT triple phase of the liver to further evaluate hepatic hemangioma.  Continued to display acute respiratory failure with hypoxia.  CXR unremarkable.  Encouraged IS.  Therapies recommended home health.  Discharged with DME oxygen, pulse oximetry, home health, and referral to cardiology/PCP.  Recommended follow-up with surgeon, Dr. Conteh, Cardiology, and PCP.      Therefore, she is discharged in fair and stable condition to home with organized home healthcare and close outpatient follow-up.    The patient met 2-midnight criteria for an inpatient stay at the time of discharge.    Review of Systems   Constitutional:  Negative for chills and fever.   HENT:  Negative for congestion and sore throat.    Eyes:  Negative for pain.   Respiratory:  Negative for cough, shortness of breath and wheezing.    Cardiovascular:  Negative for chest pain, palpitations and leg swelling.   Gastrointestinal:  Negative for diarrhea, nausea and vomiting.    Genitourinary:  Negative for dysuria.   Musculoskeletal:  Negative for myalgias.   Skin:  Negative for rash.   Neurological:  Negative for dizziness, weakness and headaches.   Psychiatric/Behavioral:  Negative for depression. The patient does not have insomnia.       Vitals:    12/19/22 0708   BP: 119/62   Pulse: 86   Resp: 16   Temp: 36.1 °C (96.9 °F)   SpO2: 92%      Physical Exam  Vitals and nursing note reviewed.   Constitutional:       General: She is not in acute distress.     Appearance: Normal appearance.   HENT:      Head: Normocephalic and atraumatic.      Right Ear: External ear normal.      Left Ear: External ear normal.      Nose: Nose normal.      Mouth/Throat:      Mouth: Mucous membranes are moist.      Pharynx: Oropharynx is clear.   Eyes:      General: No scleral icterus.     Conjunctiva/sclera: Conjunctivae normal.   Cardiovascular:      Rate and Rhythm: Normal rate and regular rhythm.      Pulses: Normal pulses.   Pulmonary:      Effort: Pulmonary effort is normal. No respiratory distress.      Breath sounds: Normal breath sounds.   Abdominal:      General: Bowel sounds are normal.      Palpations: Abdomen is soft.      Tenderness: There is no guarding or rebound.      Comments: Mild discomfort at lap insertion sites.  Sites well approximated with glue.  No evidence of drainage or erythema.     Neurological:      Mental Status: She is alert.     Discharge Date  12/19/2022    FOLLOW UP ITEMS POST DISCHARGE  -Referral to PCP and Cardiology placed.  -Ordered OP CT triple phase of the liver to further evaluate hepatic hemangioma.    DISCHARGE DIAGNOSES  Principal Problem:    NSTEMI (non-ST elevated myocardial infarction) (HCC) POA: Unknown  Active Problems:    Calculus of gallbladder POA: Unknown    Leucocytosis POA: Unknown    Advance care planning POA: Unknown    Anemia due to blood loss POA: Unknown    Acute respiratory failure with hypoxia (HCC) POA: Unknown    Hepatic hemangioma POA:  Yes  Resolved Problems:    Pain in the chest POA: Unknown    Hypertension POA: Unknown    FOLLOW UP  No future appointments.  [facility]  [address], [address]  [place] [id number] [phone]  Schedule an appointment as soon as possible for a visit  in 1 week to establish care.  Referral sent.    PCP    Schedule an appointment as soon as possible for a visit in 1 week(s)  in 1-2 weeks to establish care and re-assess blood pressure medications.    Roni Conteh M.D.  [address]  [address]  [place] [id number] [phone]    Schedule an appointment as soon as possible for a visit today  in 1-2 weeks for wound re-check    MEDICATIONS ON DISCHARGE     Medication List        START taking these medications        Instructions   oxyCODONE immediate-release 5 MG Tabs  Commonly known as: ROXICODONE   Take 1 Tablet by mouth every four hours as needed for Severe Pain for up to 3 days.  Dose: 5 mg            CONTINUE taking these medications        Instructions   acetaminophen 500 MG Tabs  Commonly known as: TYLENOL   Take 500-1,000 mg by mouth every 6 hours as needed. Indications: Pain  Dose: 500-1,000 mg     metoprolol SR 25 MG Tb24  Commonly known as: TOPROL XL   Take 1 Tablet by mouth every day.  Dose: 25 mg     omeprazole 20 MG delayed-release capsule  Commonly known as: PRILOSEC   Take 40 mg by mouth every day.  Dose: 40 mg     simvastatin 20 MG Tabs  Commonly known as: ZOCOR   Take 1 Tablet by mouth every evening.  Dose: 20 mg            Allergies  No Known Allergies    DIET  Orders Placed This Encounter   Procedures    Diet Order Diet: Low Fiber(GI Soft); Nutrient modifications: (optional): Low Fat     Standing Status:   Standing     Number of Occurrences:   1     Order Specific Question:   Diet:     Answer:   Low Fiber(GI Soft) [2]     Order Specific Question:   Nutrient modifications: (optional)     Answer:   Low Fat [5]     ACTIVITY  As tolerated.  Weight bearing as  tolerated    CONSULTATIONS  General Surgery    PROCEDURES  S/p lap isabel on 12/16.    LABORATORY  Lab Results   Component Value Date    SODIUM 141 12/19/2022    POTASSIUM 3.6 12/19/2022    CHLORIDE 101 12/19/2022    CO2 24 12/19/2022    GLUCOSE 79 12/19/2022    BUN 10 12/19/2022    CREATININE 0.43 (L) 12/19/2022      Lab Results   Component Value Date    WBC 13.1 (H) 12/19/2022    HEMOGLOBIN 11.6 (L) 12/19/2022    HEMATOCRIT 35.2 (L) 12/19/2022    PLATELETCT 321 12/19/2022      Total time of the discharge process was 36 minutes.

## 2022-12-19 NOTE — CARE PLAN
The patient is Stable - Low risk of patient condition declining or worsening    Shift Goals  Clinical Goals: Safety  Patient Goals: Rest  Family Goals: JEFFERY    Progress made toward(s) clinical / shift goals:  Pt's pain will be well managed during shift.       Problem: Fall Risk  Goal: Patient will remain free from falls  Outcome: Progressing     Problem: Pain - Standard  Goal: Alleviation of pain or a reduction in pain to the patient’s comfort goal  Outcome: Progressing

## 2022-12-19 NOTE — DISCHARGE PLANNING
Case Management Discharge Planning    Admission Date: 12/15/2022  GMLOS: 7.8  ALOS: 4    6-Clicks ADL Score: 16  6-Clicks Mobility Score: 18  PT and/or OT Eval ordered: Yes  Post-acute Referrals Ordered: No  Post-acute Choice Obtained: No  Has referral(s) been sent to post-acute provider:  No      Anticipated Discharge Dispo: Discharge Disposition: Discharged to home/self care (01)    DME Needed: Yes    DME Ordered: Yes    Action(s) Taken: Updated Provider/Nurse on Discharge Plan, Choice obtained, Referral(s) sent, and DME Face to Face     Escalations Completed: None    Medically Clear: Yes    Next Steps: Order rec'd for Home O2 and FWW. Per nsg, pt and family very much wanting to dc quickly. Traction order placed for FWW to be delivered to bedside. Cam with traction dept, a FWW was already delivered earlier today. Pt lives in [place]. [facility] can service [place] and can deliver oxygen to bedside, hopefully within 2 hours, and then pt can call [facility] when she gets home for delviery of concentrator. Choice form faxed to Yi NEGRON for referral to [facility]. A pulse ox cannot be supplied by any dme company and pt is aware to purchase at [facility].      Barriers to Discharge: Oxygen Delivery    Is the patient up for discharge tomorrow: No

## 2022-12-19 NOTE — FACE TO FACE
"Face to Face Note  -  Durable Medical Equipment    Arabella Wong D.N.P. - NPI: [id number]  I certify that this patient is under my care and that they have had a durable medical equipment(DME)face to face encounter by myself that meets the physician DME face-to-face encounter requirements with this patient on:    Date of encounter:   Patient:                    MRN:                       YOB: 2020  Katalina Correa  [id number]  1940     The encounter with the patient was in whole, or in part, for the following medical condition, which is the primary reason for durable medical equipment:  Post-Op Surgery    I certify that, based on my findings, the following durable medical equipment is medically necessary:  Oxygen   HOME O2 Saturation Measurements:(Values must be present for Home Oxygen orders)  Room air sat at rest: 90  Room air sat with amb: 84  With liters of O2: 2.5, O2 sat at rest with O2: 92  With Liters of O2: 3, O2 sat with amb with O2 : 93  Is the patient mobile?: Yes  If patient feels more short of breath, they can go up to 6 liters per minute and contact healthcare provider.    Supporting Symptoms: The patient requires supplemental oxygen, as the following interventions have been tried with limited or no improvement: \"Ambulation with oximetry and \"Incentive spirometry.    ------------------------------------------------------------------------------------------------------------------    Face to Face Supporting Documentation - Home Health    The encounter with this patient was in whole or in part the primary reason for home health admission.    Date of encounter:   Patient:                    MRN:                       YOB: 2020  Katalina Correa  [id number]  1940     Home health to see patient for:  Home health aide, Physical Therapy evaluation and treatment, and Occupational therapy evaluation and treatment    Skilled need for:  Surgical Aftercare for lap " isabel.    Skilled nursing interventions to include:  Wound Care    Homebound evidenced status by:  Need the aid of supportive devices such as crutches, canes, wheelchairs or walkers. Leaving home must require a considerable and taxing effort. There must exist a normal inability to leave the home.    Community Physician to provide follow up care: Pcp Pt States None     Optional Interventions    Wound information & treatment:    Home Infusion Therapy orders:    Line/Drain/Airway:    I certify the face to face encounter for this home care referral meets the CMS requirements and the encounter/clinical assessment with the patient was, in whole, or in part, for the medical condition(s) listed above, which is the primary reason for home health care. Based on my clinical findings: the service(s) are medically necessary, support the need for home health care, and the homebound criteria are met.  I certify that this patient has had a face to face encounter by myself.  Arabella Wong D.N.P. - NPI: [id number]    *Debility, frailty and advanced age in the absence of an acute deterioration or exacerbation of a condition do not qualify a patient for home health.

## 2022-12-19 NOTE — PROGRESS NOTES
"ACS Blue.    POD #3 s/p lap isabel    Improving overall.  /62   Pulse 86   Temp 36.1 °C (96.9 °F) (Temporal)   Resp 16   Ht 1.626 m (5' 4\")   Wt 76.7 kg (169 lb 1.5 oz)   SpO2 92%   BMI 29.02 kg/m²   WBC coming down as expected.    Ok to discharge home when medically cleared.  Diet of choice and prophylactic Lovenox ok.  Follow up 1-2 weeks at ACS clinic (will put in discharge tab.)  ACS will sign off. Please call with questions or concerns.   "

## 2022-12-19 NOTE — DISCHARGE PLANNING
Received Choice form at [id number]  Agency/Facility Name: [facility]  Referral sent per Choice form @ 0871     @7043  Agency/Facility Name: [facility]  Spoke To: Darnell  Outcome: Portable O2 and concentrator were delivered bedside.

## 2022-12-19 NOTE — PROGRESS NOTES
Pt A&Ox4 and VSS 3L NC at time of assessment. Pt oriented to staff, room, and use of call light. Pt verbalized understanding plan, of care and fall precautions per policy. Bed locked and in lowest position. Call light in reach.

## 2022-12-19 NOTE — CARE PLAN
The patient is Stable - Low risk of patient condition declining or worsening    Shift Goals  Clinical Goals: wean off o2  Patient Goals: rest  Family Goals: JEFFERY    Progress made toward(s) clinical / shift goals:  pt did not sustain injuries this shift, pt meds administered per MAR      Problem: Knowledge Deficit - Standard  Goal: Patient and family/care givers will demonstrate understanding of plan of care, disease process/condition, diagnostic tests and medications  Outcome: Progressing     Problem: Fall Risk  Goal: Patient will remain free from falls  Outcome: Progressing     Problem: Pain - Standard  Goal: Alleviation of pain or a reduction in pain to the patient’s comfort goal  Outcome: Progressing       Patient is not progressing towards the following goals:  Pt needs to be reeducated on importance of oxygen during stay

## 2022-12-20 NOTE — DISCHARGE PLANNING
Received Choice form at   Agency/Facility Name: [facility]  Referral sent per Choice form @ 9750    Pt discharged yesterday, 12/19/22.     @1512  Agency/Facility Name: [facility]  Spoke To: Rocio  Outcome: Rocio will call the office for an update and call this DPA back.    @3318  Agency/Facility Name: [facility]  Spoke To: Rocio  Outcome: DPA sent the referral to the [facility] in error.  DPA resent the home health referral to the [facility].  Rocio is keeping an eye out for the referral and will all this DPA back as soon as the referral has been reviewed.

## 2022-12-21 ENCOUNTER — TELEPHONE (OUTPATIENT)
Dept: SCHEDULING | Facility: IMAGING CENTER | Age: 82
End: 2022-12-21

## 2022-12-21 NOTE — DISCHARGE PLANNING
F/U on pt that dc'd yesterday.   Rec'd call from . Rev'd dc needs with  over the phone. Pt has no PCP. Lives in [place].  given info on [facility] , also on [facility], and homemaker info, etc. Pt lives in [place] and is having a difficult time finding resources in that area. Pt having difficult time finding a PCP. Also, pt is pending [facility]  review and possible acceptance. Yi NEGRON sent referral and spoke to Rocio with [facility] that pt needs assist arranging a PCP and [facility] may be able to assist.  given [facility] contact info also and understands the referral is pending.

## 2022-12-21 NOTE — DISCHARGE PLANNING
Agency/Facility Name: [facility]  Outcome: DPA left a voice message for Rocio.  Awaiting a call back.    @[phone]  Agency/Facility Name: [facility]  Spoke To: Rocio  Outcome: Referral approved.

## 2023-01-08 ENCOUNTER — TELEPHONE (OUTPATIENT)
Dept: SCHEDULING | Facility: IMAGING CENTER | Age: 83
End: 2023-01-08

## 2023-01-17 ENCOUNTER — APPOINTMENT (OUTPATIENT)
Dept: MEDICAL GROUP | Facility: PHYSICIAN GROUP | Age: 83
End: 2023-01-17
Payer: MEDICARE

## 2023-01-31 NOTE — DOCUMENTATION QUERY
[facility]                                                                       Query Response Note      PATIENT:               JUJU ANSARI  ACCT #:                  [id number]  MRN:                     [id number]  :                      1940  ADMIT DATE:       12/15/2022 8:54 AM  DISCH DATE:        2022 3:58 PM  RESPONDING  PROVIDER #:        [id number]           QUERY TEXT:    An MI type 2 is documented in the D/S. This type of MI requires the underlying cause of the MI. Please review the record and provide response for cause  of NSTEMI.    NOTE:  If an appropriate response is not listed below, please respond with a new note.    Vivienne Pathak [facility], [facility]  [email or web address]                                                                                                                                            The patient's Clinical Indicators include:  Clinical Indicators:  Patient started to have indigestion last night at 630p and progressively got worse.   Initial trop 106, then 60    Per ER note: Certainly this could be a type II MI secondary to acute cholecystitis versus a incidental gallstone with NSTEMI    Treatment:  Telemetry monitoring  Follow serial troponin/CK-MB/EKG  Transthoracic echocardiograph  O2 2L per nasal cannula to keep saturation more than 92%  Loading with aspirin 325 mg first now if not already given, then aspirin 81 mg daily  On simvastatin   Metoprolol   Nitropatch PRN  NPO    Cardiology consulted  Check lipid panel  CBC/BMP AM  Options provided:   -- incidental gallstone   -- acute cholecystitis   -- Other, please specify with a new note   -- Unable to determine      Query created by: Vivienne Pathak on 2023 4:16 AM    RESPONSE TEXT:    acute cholecystitis          Electronically signed by:  NELLI ZAYAS MD 2023 12:45 PM

## (undated) DEVICE — GLOVE BIOGEL INDICATOR SZ 6.5 SURGICAL PF LTX - (50PR/BX 4BX/CA)

## (undated) DEVICE — ELECTRODE DUAL RETURN W/ CORD - (50/PK)

## (undated) DEVICE — SUTURE 0 VICRYL PLUS UR-6 - 27 INCH (36/BX)

## (undated) DEVICE — SUTURE 4-0 MONOCRYL PLUS PS-2 - 27 INCH (36/BX)

## (undated) DEVICE — TROCAR Z THREAD11MM OPTICAL - NON BLADED(6/BX)

## (undated) DEVICE — BAG RETRIEVAL 10ML (10EA/BX)

## (undated) DEVICE — TROCAR 5X100 NON BLADED Z-TH - READ KII (6/BX)

## (undated) DEVICE — TROCAR LAPSCP 100MM 12MM NTHRD - (6/BX)

## (undated) DEVICE — CHLORAPREP 26 ML APPLICATOR - ORANGE TINT(25/CA)

## (undated) DEVICE — SET LEADWIRE 5 LEAD BEDSIDE DISPOSABLE ECG (1SET OF 5/EA)

## (undated) DEVICE — TUBING CLEARLINK DUO-VENT - C-FLO (48EA/CA)

## (undated) DEVICE — SUCTION INSTRUMENT YANKAUER BULBOUS TIP W/O VENT (50EA/CA)

## (undated) DEVICE — SENSOR OXIMETER ADULT SPO2 RD SET (20EA/BX)

## (undated) DEVICE — GOWN WARMING STANDARD FLEX - (30/CA)

## (undated) DEVICE — LACTATED RINGERS INJ 1000 ML - (14EA/CA 60CA/PF)

## (undated) DEVICE — SET SUCTION/IRRIGATION WITH DISPOSABLE TIP (6/CA )PART #0250-070-520 IS A SUB

## (undated) DEVICE — GLOVE BIOGEL SZ 6.5 SURGICAL PF LTX (50PR/BX 4BX/CA)

## (undated) DEVICE — SUTURE GENERAL

## (undated) DEVICE — SET EXTENSION WITH 2 PORTS (48EA/CA) ***PART #2C8610 IS A SUBSTITUTE*****

## (undated) DEVICE — SLEEVE VASO CALF MED - (10PR/CA)

## (undated) DEVICE — CANISTER SUCTION 3000ML MECHANICAL FILTER AUTO SHUTOFF MEDI-VAC NONSTERILE LF DISP  (40EA/CA)

## (undated) DEVICE — CLIP MED LG INTNL HRZN TI ESCP - (20/BX)

## (undated) DEVICE — GLOVE BIOGEL INDICATOR SZ 7SURGICAL PF LTX - (50/BX 4BX/CA)

## (undated) DEVICE — GLOVE BIOGEL SZ 7 SURGICAL PF LTX - (50PR/BX 4BX/CA)

## (undated) DEVICE — GLOVE BIOGEL PI INDICATOR SZ 7.5 SURGICAL PF LF -(50/BX 4BX/CA)

## (undated) DEVICE — COVER LIGHT HANDLE ALC PLUS DISP (18EA/BX)

## (undated) DEVICE — PACK LAP CHOLE OR - (2EA/CA)

## (undated) DEVICE — SODIUM CHL IRRIGATION 0.9% 1000ML (12EA/CA)